# Patient Record
Sex: MALE | Race: WHITE | HISPANIC OR LATINO | Employment: FULL TIME | URBAN - METROPOLITAN AREA
[De-identification: names, ages, dates, MRNs, and addresses within clinical notes are randomized per-mention and may not be internally consistent; named-entity substitution may affect disease eponyms.]

---

## 2022-11-22 ENCOUNTER — HOSPITAL ENCOUNTER (EMERGENCY)
Facility: HOSPITAL | Age: 57
Discharge: HOME | End: 2022-11-22
Attending: EMERGENCY MEDICINE
Payer: MEDICAID

## 2022-11-22 VITALS
SYSTOLIC BLOOD PRESSURE: 164 MMHG | RESPIRATION RATE: 21 BRPM | OXYGEN SATURATION: 98 % | DIASTOLIC BLOOD PRESSURE: 81 MMHG | HEART RATE: 89 BPM | HEIGHT: 65 IN | TEMPERATURE: 98 F

## 2022-11-22 DIAGNOSIS — R11.0 NAUSEA: ICD-10-CM

## 2022-11-22 DIAGNOSIS — R10.11 RIGHT UPPER QUADRANT ABDOMINAL PAIN: Primary | ICD-10-CM

## 2022-11-22 DIAGNOSIS — R73.9 HYPERGLYCEMIA WITHOUT KETOSIS: ICD-10-CM

## 2022-11-22 DIAGNOSIS — I10 PRIMARY HYPERTENSION: ICD-10-CM

## 2022-11-22 DIAGNOSIS — Z91.148 NONCOMPLIANCE WITH MEDICATION REGIMEN: ICD-10-CM

## 2022-11-22 LAB
ALBUMIN SERPL BCP-MCNC: 4.5 G/DL (ref 3.5–5.2)
ALP SERPL-CCNC: 74 U/L (ref 55–135)
ALT SERPL W/O P-5'-P-CCNC: 22 U/L (ref 10–44)
ANION GAP SERPL CALC-SCNC: 15 MMOL/L (ref 8–16)
AST SERPL-CCNC: 17 U/L (ref 10–40)
BASOPHILS # BLD AUTO: 0.03 K/UL (ref 0–0.2)
BASOPHILS NFR BLD: 0.2 % (ref 0–1.9)
BILIRUB SERPL-MCNC: 0.8 MG/DL (ref 0.1–1)
BUN SERPL-MCNC: 20 MG/DL (ref 6–20)
CALCIUM SERPL-MCNC: 9.5 MG/DL (ref 8.7–10.5)
CHLORIDE SERPL-SCNC: 97 MMOL/L (ref 95–110)
CO2 SERPL-SCNC: 24 MMOL/L (ref 23–29)
CREAT SERPL-MCNC: 1.2 MG/DL (ref 0.5–1.4)
DIFFERENTIAL METHOD: ABNORMAL
EOSINOPHIL # BLD AUTO: 0 K/UL (ref 0–0.5)
EOSINOPHIL NFR BLD: 0.1 % (ref 0–8)
ERYTHROCYTE [DISTWIDTH] IN BLOOD BY AUTOMATED COUNT: 11.5 % (ref 11.5–14.5)
EST. GFR  (NO RACE VARIABLE): >60 ML/MIN/1.73 M^2
GLUCOSE SERPL-MCNC: 374 MG/DL (ref 70–110)
HCT VFR BLD AUTO: 47.5 % (ref 40–54)
HCV AB SERPL QL IA: NEGATIVE
HEP C VIRUS HOLD SPECIMEN: NORMAL
HGB BLD-MCNC: 17 G/DL (ref 14–18)
HIV 1+2 AB+HIV1 P24 AG SERPL QL IA: NEGATIVE
IMM GRANULOCYTES # BLD AUTO: 0.04 K/UL (ref 0–0.04)
IMM GRANULOCYTES NFR BLD AUTO: 0.3 % (ref 0–0.5)
LACTATE SERPL-SCNC: 3.2 MMOL/L (ref 0.5–2.2)
LIPASE SERPL-CCNC: 13 U/L (ref 4–60)
LYMPHOCYTES # BLD AUTO: 1.1 K/UL (ref 1–4.8)
LYMPHOCYTES NFR BLD: 8.4 % (ref 18–48)
MCH RBC QN AUTO: 30.2 PG (ref 27–31)
MCHC RBC AUTO-ENTMCNC: 35.8 G/DL (ref 32–36)
MCV RBC AUTO: 85 FL (ref 82–98)
MONOCYTES # BLD AUTO: 0.5 K/UL (ref 0.3–1)
MONOCYTES NFR BLD: 3.5 % (ref 4–15)
NEUTROPHILS # BLD AUTO: 11.6 K/UL (ref 1.8–7.7)
NEUTROPHILS NFR BLD: 87.5 % (ref 38–73)
NRBC BLD-RTO: 0 /100 WBC
PLATELET # BLD AUTO: 210 K/UL (ref 150–450)
PMV BLD AUTO: 11 FL (ref 9.2–12.9)
POCT GLUCOSE: 251 MG/DL (ref 70–110)
POCT GLUCOSE: 366 MG/DL (ref 70–110)
POTASSIUM SERPL-SCNC: 3.9 MMOL/L (ref 3.5–5.1)
PROT SERPL-MCNC: 8.3 G/DL (ref 6–8.4)
RBC # BLD AUTO: 5.62 M/UL (ref 4.6–6.2)
SODIUM SERPL-SCNC: 136 MMOL/L (ref 136–145)
WBC # BLD AUTO: 13.26 K/UL (ref 3.9–12.7)

## 2022-11-22 PROCEDURE — 25000003 PHARM REV CODE 250: Performed by: EMERGENCY MEDICINE

## 2022-11-22 PROCEDURE — 87389 HIV-1 AG W/HIV-1&-2 AB AG IA: CPT | Performed by: EMERGENCY MEDICINE

## 2022-11-22 PROCEDURE — 96374 THER/PROPH/DIAG INJ IV PUSH: CPT

## 2022-11-22 PROCEDURE — 80053 COMPREHEN METABOLIC PANEL: CPT | Performed by: EMERGENCY MEDICINE

## 2022-11-22 PROCEDURE — 86803 HEPATITIS C AB TEST: CPT | Performed by: EMERGENCY MEDICINE

## 2022-11-22 PROCEDURE — 83605 ASSAY OF LACTIC ACID: CPT | Performed by: EMERGENCY MEDICINE

## 2022-11-22 PROCEDURE — 83690 ASSAY OF LIPASE: CPT | Performed by: EMERGENCY MEDICINE

## 2022-11-22 PROCEDURE — 96361 HYDRATE IV INFUSION ADD-ON: CPT

## 2022-11-22 PROCEDURE — 99285 EMERGENCY DEPT VISIT HI MDM: CPT | Mod: 25

## 2022-11-22 PROCEDURE — 96375 TX/PRO/DX INJ NEW DRUG ADDON: CPT

## 2022-11-22 PROCEDURE — 63600175 PHARM REV CODE 636 W HCPCS: Performed by: EMERGENCY MEDICINE

## 2022-11-22 PROCEDURE — 85025 COMPLETE CBC W/AUTO DIFF WBC: CPT | Performed by: EMERGENCY MEDICINE

## 2022-11-22 RX ORDER — CLONIDINE HYDROCHLORIDE 0.1 MG/1
0.1 TABLET ORAL 2 TIMES DAILY
Qty: 60 TABLET | Refills: 0 | Status: ON HOLD | OUTPATIENT
Start: 2022-11-22 | End: 2022-11-29 | Stop reason: HOSPADM

## 2022-11-22 RX ORDER — ONDANSETRON 2 MG/ML
4 INJECTION INTRAMUSCULAR; INTRAVENOUS
Status: COMPLETED | OUTPATIENT
Start: 2022-11-22 | End: 2022-11-22

## 2022-11-22 RX ORDER — ONDANSETRON 4 MG/1
4 TABLET, ORALLY DISINTEGRATING ORAL EVERY 6 HOURS PRN
Qty: 12 TABLET | Refills: 0 | Status: ON HOLD | OUTPATIENT
Start: 2022-11-22 | End: 2023-05-21 | Stop reason: HOSPADM

## 2022-11-22 RX ORDER — FAMOTIDINE 20 MG/1
20 TABLET, FILM COATED ORAL 2 TIMES DAILY PRN
Qty: 20 TABLET | Refills: 0 | Status: ON HOLD | OUTPATIENT
Start: 2022-11-22 | End: 2022-11-29 | Stop reason: SDUPTHER

## 2022-11-22 RX ORDER — MORPHINE SULFATE 4 MG/ML
6 INJECTION, SOLUTION INTRAMUSCULAR; INTRAVENOUS
Status: COMPLETED | OUTPATIENT
Start: 2022-11-22 | End: 2022-11-22

## 2022-11-22 RX ORDER — METFORMIN HYDROCHLORIDE 1000 MG/1
1000 TABLET ORAL 2 TIMES DAILY WITH MEALS
Qty: 60 TABLET | Refills: 1 | Status: SHIPPED | OUTPATIENT
Start: 2022-11-22 | End: 2023-11-22

## 2022-11-22 RX ORDER — CLONIDINE HYDROCHLORIDE 0.1 MG/1
0.1 TABLET ORAL
Status: DISCONTINUED | OUTPATIENT
Start: 2022-11-22 | End: 2022-11-22 | Stop reason: HOSPADM

## 2022-11-22 RX ADMIN — SODIUM CHLORIDE 1000 ML: 0.9 INJECTION, SOLUTION INTRAVENOUS at 05:11

## 2022-11-22 RX ADMIN — MORPHINE SULFATE 6 MG: 4 INJECTION INTRAVENOUS at 03:11

## 2022-11-22 RX ADMIN — SODIUM CHLORIDE 1000 ML: 0.9 INJECTION, SOLUTION INTRAVENOUS at 03:11

## 2022-11-22 RX ADMIN — ONDANSETRON 4 MG: 2 INJECTION INTRAMUSCULAR; INTRAVENOUS at 03:11

## 2022-11-22 NOTE — ED PROVIDER NOTES
SCRIBE #1 NOTE: I, Dominick Castro, am scribing for, and in the presence of, Shady Grande MD. I have scribed the HPI, ROS, and PEx.     SCRIBE #2 NOTE: I, Althea Hernandez, am scribing for, and in the presence of,  Jose Hedrick Jr., MD. I have scribed the remaining portions of the note not scribed by Scribe #1.   History      Chief Complaint   Patient presents with    Abdominal Pain     Left upper quad pain starting last night, associated vomiting        Review of patient's allergies indicates:  No Known Allergies     HPI   HPI    11/22/2022, 3:23 PM   History obtained from the patient      History of Present Illness: Russell Orellana is a 57 y.o. male patient with a PMHx of DM, HTN who presents to the Emergency Department for R-sided abdominal pain, onset 4 hours PTA. Pt states that his pain started shortly after eating lunch at 11AM. Pt states that he also ate and drank at 2PM today. Symptoms are constant and moderate in severity. No mitigating or exacerbating factors reported. Associated sxs include n/v. Patient denies any fever, chills, SOB, CP, weakness, numbness, dizziness, headache, and all other sxs at this time. Pt states that he has not been compliant with his DM medication regimen. No further complaints or concerns at this time.     Arrival mode: EMS    PCP: Primary Doctor No       Past Medical History:  Past Medical History:   Diagnosis Date    Diabetes mellitus     High cholesterol     Hypertension        Past Surgical History:  History reviewed. No pertinent surgical history.      Family History:  Family History   Problem Relation Age of Onset    No Known Problems Mother     No Known Problems Father        Social History:  Social History     Tobacco Use    Smoking status: Never    Smokeless tobacco: Never   Substance and Sexual Activity    Alcohol use: Not Currently    Drug use: Never    Sexual activity: Not on file       ROS   Review of Systems   Constitutional:  Negative for chills and fever.   HENT:   "Negative for sore throat.    Respiratory:  Negative for shortness of breath.    Cardiovascular:  Negative for chest pain.   Gastrointestinal:  Positive for abdominal pain (R), nausea and vomiting. Negative for diarrhea.   Genitourinary:  Negative for dysuria.   Musculoskeletal:  Negative for back pain.   Skin:  Negative for rash.   Neurological:  Negative for dizziness, weakness, light-headedness, numbness and headaches.   Hematological:  Does not bruise/bleed easily.   All other systems reviewed and are negative.    Physical Exam      Initial Vitals [11/22/22 1512]   BP Pulse Resp Temp SpO2   (!) 190/100 100 (!) 22 98.1 °F (36.7 °C) 96 %      MAP       --          Vitals:    11/22/22 1512 11/22/22 1525 11/22/22 1535 11/22/22 1600   BP: (!) 190/100  (!) 175/87 (!) 157/77   Pulse: 100  86 87   Resp: (!) 22 18 12 18   Temp: 98.1 °F (36.7 °C)      SpO2: 96%  95% 96%   Height: 5' 5" (1.651 m)         Physical Exam  Nursing Notes and Vital Signs Reviewed.  Constitutional: Patient is in no acute distress. Well-developed and well-nourished.  Head: Atraumatic. Normocephalic.  Eyes: PERRL. EOM intact. Conjunctivae are not pale. No scleral icterus.  ENT: Mucous membranes are moist. Oropharynx is clear and symmetric.    Neck: Supple. Full ROM.  Cardiovascular: Regular rate. Regular rhythm. No murmurs, rubs, or gallops. Distal pulses are 2+ and symmetric.  Pulmonary/Chest: No respiratory distress. Clear to auscultation bilaterally. No wheezing or rales.  Abdominal: Soft and non-distended.  There is no tenderness.  No rebound, guarding, or rigidity.   Musculoskeletal: Moves all extremities. No obvious deformities. No edema.  Skin: Warm and dry.  Neurological:  Alert, awake, and appropriate.  Normal speech.  No acute focal neurological deficits are appreciated.  Psychiatric: Normal affect. Good eye contact. Appropriate in content.    ED Course    Procedures  ED Vital Signs:  Vitals:    11/22/22 1512 11/22/22 1525 11/22/22 1535 " "11/22/22 1600   BP: (!) 190/100  (!) 175/87 (!) 157/77   Pulse: 100  86 87   Resp: (!) 22 18 12 18   Temp: 98.1 °F (36.7 °C)      SpO2: 96%  95% 96%   Height: 5' 5" (1.651 m)          Abnormal Lab Results:  Labs Reviewed   CBC W/ AUTO DIFFERENTIAL - Abnormal; Notable for the following components:       Result Value    WBC 13.26 (*)     Gran # (ANC) 11.6 (*)     Gran % 87.5 (*)     Lymph % 8.4 (*)     Mono % 3.5 (*)     All other components within normal limits    Narrative:     Release to patient->Immediate   COMPREHENSIVE METABOLIC PANEL - Abnormal; Notable for the following components:    Glucose 374 (*)     All other components within normal limits    Narrative:     Release to patient->Immediate   LACTIC ACID, PLASMA - Abnormal; Notable for the following components:    Lactate (Lactic Acid) 3.2 (*)     All other components within normal limits    Narrative:     Release to patient->Immediate   POCT GLUCOSE - Abnormal; Notable for the following components:    POCT Glucose 366 (*)     All other components within normal limits   LIPASE    Narrative:     Release to patient->Immediate   HIV 1 / 2 ANTIBODY    Narrative:     Release to patient->Immediate   HEPATITIS C ANTIBODY    Narrative:     Release to patient->Immediate   HEP C VIRUS HOLD SPECIMEN    Narrative:     Release to patient->Immediate   URINALYSIS, REFLEX TO URINE CULTURE        All Lab Results:  Results for orders placed or performed during the hospital encounter of 11/22/22   CBC W/ AUTO DIFFERENTIAL   Result Value Ref Range    WBC 13.26 (H) 3.90 - 12.70 K/uL    RBC 5.62 4.60 - 6.20 M/uL    Hemoglobin 17.0 14.0 - 18.0 g/dL    Hematocrit 47.5 40.0 - 54.0 %    MCV 85 82 - 98 fL    MCH 30.2 27.0 - 31.0 pg    MCHC 35.8 32.0 - 36.0 g/dL    RDW 11.5 11.5 - 14.5 %    Platelets 210 150 - 450 K/uL    MPV 11.0 9.2 - 12.9 fL    Immature Granulocytes 0.3 0.0 - 0.5 %    Gran # (ANC) 11.6 (H) 1.8 - 7.7 K/uL    Immature Grans (Abs) 0.04 0.00 - 0.04 K/uL    Lymph # 1.1 " 1.0 - 4.8 K/uL    Mono # 0.5 0.3 - 1.0 K/uL    Eos # 0.0 0.0 - 0.5 K/uL    Baso # 0.03 0.00 - 0.20 K/uL    nRBC 0 0 /100 WBC    Gran % 87.5 (H) 38.0 - 73.0 %    Lymph % 8.4 (L) 18.0 - 48.0 %    Mono % 3.5 (L) 4.0 - 15.0 %    Eosinophil % 0.1 0.0 - 8.0 %    Basophil % 0.2 0.0 - 1.9 %    Differential Method Automated    Comp. Metabolic Panel   Result Value Ref Range    Sodium 136 136 - 145 mmol/L    Potassium 3.9 3.5 - 5.1 mmol/L    Chloride 97 95 - 110 mmol/L    CO2 24 23 - 29 mmol/L    Glucose 374 (H) 70 - 110 mg/dL    BUN 20 6 - 20 mg/dL    Creatinine 1.2 0.5 - 1.4 mg/dL    Calcium 9.5 8.7 - 10.5 mg/dL    Total Protein 8.3 6.0 - 8.4 g/dL    Albumin 4.5 3.5 - 5.2 g/dL    Total Bilirubin 0.8 0.1 - 1.0 mg/dL    Alkaline Phosphatase 74 55 - 135 U/L    AST 17 10 - 40 U/L    ALT 22 10 - 44 U/L    Anion Gap 15 8 - 16 mmol/L    eGFR >60 >60 mL/min/1.73 m^2   Lipase   Result Value Ref Range    Lipase 13 4 - 60 U/L   Lactic acid, plasma   Result Value Ref Range    Lactate (Lactic Acid) 3.2 (H) 0.5 - 2.2 mmol/L   HIV 1/2 Ag/Ab (4th Gen)   Result Value Ref Range    HIV 1/2 Ag/Ab Negative Negative   Hepatitis C Antibody   Result Value Ref Range    Hepatitis C Ab Negative Negative   HCV Virus Hold Specimen   Result Value Ref Range    HEP C Virus Hold Specimen Hold for HCV sendout    POCT glucose   Result Value Ref Range    POCT Glucose 366 (H) 70 - 110 mg/dL     Imaging Results:  Imaging Results              US Abdomen Limited (Final result)  Result time 11/22/22 16:09:12      Final result by Jess Escobar MD (11/22/22 16:09:12)                   Impression:      No significant sonographic abnormality.  Limited exam due to body habitus.  The common bile duct was not visualized      Electronically signed by: Shawn Mercado  Date:    11/22/2022  Time:    16:09               Narrative:    EXAMINATION:  US ABDOMEN LIMITED    CLINICAL HISTORY:  Abdominal Pain - Gallbladder;    TECHNIQUE:  Limited ultrasound of the right upper  quadrant of the abdomen (including pancreas, liver, gallbladder, common bile duct, and spleen) was performed.    COMPARISON:  None.    FINDINGS:  Liver: Normal in size, measuring 13.3 cm. Homogeneous echotexture. No focal hepatic lesions.    Gallbladder: No calculi, wall thickening, or pericholecystic fluid.  No sonographic Gibbs's sign.    Biliary system: The common bile duct of was not visualized no intrahepatic ductal dilatation.    The right kidney is without hydronephrosis measuring up to 10.1 cm    Miscellaneous: No upper abdominal ascites.                                              The Emergency Provider reviewed the vital signs and test results, which are outlined above.    ED Discussion     5:21 PM patient's abdomen is benign.  He has no tenderness palpation.  He is on his 2 L of IV fluids he has hyperglycemia without ketosis.  He has been noncompliant with medications and states he will start up with his medications for his hypertension as well as his diabetes and continue with a diabetic diet.         ED Medication(s):  Medications   sodium chloride 0.9% bolus 1,000 mL (has no administration in time range)   cloNIDine tablet 0.1 mg (has no administration in time range)   sodium chloride 0.9% bolus 1,000 mL (1,000 mLs Intravenous New Bag 11/22/22 1526)   morphine injection 6 mg (6 mg Intravenous Given 11/22/22 1525)   ondansetron injection 4 mg (4 mg Intravenous Given 11/22/22 1525)     New Prescriptions    CLONIDINE (CATAPRES) 0.1 MG TABLET    Take 1 tablet (0.1 mg total) by mouth 2 (two) times daily.    FAMOTIDINE (PEPCID) 20 MG TABLET    Take 1 tablet (20 mg total) by mouth 2 (two) times daily as needed for Heartburn.    METFORMIN (GLUCOPHAGE) 1000 MG TABLET    Take 1 tablet (1,000 mg total) by mouth 2 (two) times daily with meals.    ONDANSETRON (ZOFRAN-ODT) 4 MG TBDL    Take 1 tablet (4 mg total) by mouth every 6 (six) hours as needed (nausea).      Follow-up Information       Your primary care  physician In 3 days.               Schedule an appointment as soon as possible for a visit  with [facility].    Why: As needed  Contact information:  [address] [facility] [address] [phone]                               Medical Decision Making    Medical Decision Making:   Clinical Tests:   Lab Tests: Ordered and Reviewed  Radiological Study: Ordered and Reviewed         Scribe Attestation:   Scribe #1: I performed the above scribed service and the documentation accurately describes the services I performed. I attest to the accuracy of the note.    Attending:   Physician Attestation Statement for Scribe #1: I, Shady Grande MD, personally performed the services described in this documentation, as scribed by Dominick Castro, in my presence, and it is both accurate and complete.       Scribe Attestation:   Scribe #2: I performed the above scribed service and the documentation accurately describes the services I performed. I attest to the accuracy of the note.    Attending Attestation:           Physician Attestation for Scribe:    Physician Attestation Statement for Scribe #2: I, Jose Hedrick Jr., MD, reviewed documentation, as scribed by Althea Hernandez in my presence, and it is both accurate and complete. I also acknowledge and confirm the content of the note done by Scribe #1.        Clinical Impression       ICD-10-CM ICD-9-CM   1. Right upper quadrant abdominal pain  R10.11 789.01   2. Nausea  R11.0 787.02   3. Primary hypertension  I10 401.9   4. Noncompliance with medication regimen  Z91.14 V15.81   5. Hyperglycemia without ketosis  R73.9 790.29       Disposition:   Disposition: Discharged  Condition: Stable       Shady Grande MD  11/22/22 3187

## 2022-11-27 ENCOUNTER — HOSPITAL ENCOUNTER (OUTPATIENT)
Facility: HOSPITAL | Age: 57
Discharge: HOME | End: 2022-11-29
Attending: EMERGENCY MEDICINE | Admitting: INTERNAL MEDICINE
Payer: MEDICAID

## 2022-11-27 DIAGNOSIS — R73.9 HYPERGLYCEMIA: ICD-10-CM

## 2022-11-27 DIAGNOSIS — E11.65 TYPE 2 DIABETES MELLITUS WITH HYPERGLYCEMIA, WITHOUT LONG-TERM CURRENT USE OF INSULIN: Primary | ICD-10-CM

## 2022-11-27 DIAGNOSIS — R10.9 ABDOMINAL PAIN, UNSPECIFIED ABDOMINAL LOCATION: ICD-10-CM

## 2022-11-27 PROBLEM — I10 PRIMARY HYPERTENSION: Status: ACTIVE | Noted: 2022-11-27

## 2022-11-27 PROBLEM — R10.84 GENERALIZED ABDOMINAL PAIN: Status: ACTIVE | Noted: 2022-11-27

## 2022-11-27 LAB
ALBUMIN SERPL BCP-MCNC: 4.5 G/DL (ref 3.5–5.2)
ALP SERPL-CCNC: 79 U/L (ref 55–135)
ALT SERPL W/O P-5'-P-CCNC: 15 U/L (ref 10–44)
AMPHET+METHAMPHET UR QL: NEGATIVE
ANION GAP SERPL CALC-SCNC: 17 MMOL/L (ref 8–16)
AST SERPL-CCNC: 16 U/L (ref 10–40)
B-OH-BUTYR BLD STRIP-SCNC: 0.9 MMOL/L (ref 0–0.5)
BACTERIA #/AREA URNS HPF: NORMAL /HPF
BARBITURATES UR QL SCN>200 NG/ML: NEGATIVE
BASOPHILS # BLD AUTO: 0.04 K/UL (ref 0–0.2)
BASOPHILS NFR BLD: 0.3 % (ref 0–1.9)
BENZODIAZ UR QL SCN>200 NG/ML: NEGATIVE
BILIRUB SERPL-MCNC: 0.8 MG/DL (ref 0.1–1)
BILIRUB UR QL STRIP: NEGATIVE
BUN SERPL-MCNC: 21 MG/DL (ref 6–20)
BZE UR QL SCN: NEGATIVE
CALCIUM SERPL-MCNC: 10 MG/DL (ref 8.7–10.5)
CANNABINOIDS UR QL SCN: NEGATIVE
CHLORIDE SERPL-SCNC: 98 MMOL/L (ref 95–110)
CLARITY UR: CLEAR
CO2 SERPL-SCNC: 21 MMOL/L (ref 23–29)
COLOR UR: COLORLESS
CREAT SERPL-MCNC: 1.2 MG/DL (ref 0.5–1.4)
CREAT UR-MCNC: 25.9 MG/DL (ref 23–375)
DIFFERENTIAL METHOD: ABNORMAL
EOSINOPHIL # BLD AUTO: 0 K/UL (ref 0–0.5)
EOSINOPHIL NFR BLD: 0 % (ref 0–8)
ERYTHROCYTE [DISTWIDTH] IN BLOOD BY AUTOMATED COUNT: 11.3 % (ref 11.5–14.5)
EST. GFR  (NO RACE VARIABLE): >60 ML/MIN/1.73 M^2
GLUCOSE SERPL-MCNC: 393 MG/DL (ref 70–110)
GLUCOSE UR QL STRIP: ABNORMAL
HCT VFR BLD AUTO: 47.3 % (ref 40–54)
HEP C VIRUS HOLD SPECIMEN: NORMAL
HGB BLD-MCNC: 17.1 G/DL (ref 14–18)
HGB UR QL STRIP: NEGATIVE
HYALINE CASTS #/AREA URNS LPF: 0 /LPF
IMM GRANULOCYTES # BLD AUTO: 0.05 K/UL (ref 0–0.04)
IMM GRANULOCYTES NFR BLD AUTO: 0.4 % (ref 0–0.5)
KETONES UR QL STRIP: ABNORMAL
LEUKOCYTE ESTERASE UR QL STRIP: NEGATIVE
LIPASE SERPL-CCNC: 19 U/L (ref 4–60)
LYMPHOCYTES # BLD AUTO: 0.8 K/UL (ref 1–4.8)
LYMPHOCYTES NFR BLD: 6.4 % (ref 18–48)
MCH RBC QN AUTO: 30.4 PG (ref 27–31)
MCHC RBC AUTO-ENTMCNC: 36.2 G/DL (ref 32–36)
MCV RBC AUTO: 84 FL (ref 82–98)
METHADONE UR QL SCN>300 NG/ML: NEGATIVE
MICROSCOPIC COMMENT: NORMAL
MONOCYTES # BLD AUTO: 0.3 K/UL (ref 0.3–1)
MONOCYTES NFR BLD: 2.3 % (ref 4–15)
NEUTROPHILS # BLD AUTO: 11.2 K/UL (ref 1.8–7.7)
NEUTROPHILS NFR BLD: 90.6 % (ref 38–73)
NITRITE UR QL STRIP: NEGATIVE
NRBC BLD-RTO: 0 /100 WBC
OPIATES UR QL SCN: NEGATIVE
PCP UR QL SCN>25 NG/ML: NEGATIVE
PH UR STRIP: 8 [PH] (ref 5–8)
PLATELET # BLD AUTO: 197 K/UL (ref 150–450)
PMV BLD AUTO: 11 FL (ref 9.2–12.9)
POCT GLUCOSE: 292 MG/DL (ref 70–110)
POCT GLUCOSE: 307 MG/DL (ref 70–110)
POCT GLUCOSE: 385 MG/DL (ref 70–110)
POTASSIUM SERPL-SCNC: 4.4 MMOL/L (ref 3.5–5.1)
PROT SERPL-MCNC: 8.4 G/DL (ref 6–8.4)
PROT UR QL STRIP: ABNORMAL
RBC # BLD AUTO: 5.63 M/UL (ref 4.6–6.2)
RBC #/AREA URNS HPF: 3 /HPF (ref 0–4)
SODIUM SERPL-SCNC: 136 MMOL/L (ref 136–145)
SP GR UR STRIP: 1.02 (ref 1–1.03)
TOXICOLOGY INFORMATION: NORMAL
URN SPEC COLLECT METH UR: ABNORMAL
UROBILINOGEN UR STRIP-ACNC: NEGATIVE EU/DL
WBC # BLD AUTO: 12.41 K/UL (ref 3.9–12.7)
WBC #/AREA URNS HPF: 1 /HPF (ref 0–5)
YEAST URNS QL MICRO: NORMAL

## 2022-11-27 PROCEDURE — 81000 URINALYSIS NONAUTO W/SCOPE: CPT | Mod: 59 | Performed by: INTERNAL MEDICINE

## 2022-11-27 PROCEDURE — 80053 COMPREHEN METABOLIC PANEL: CPT | Performed by: NURSE PRACTITIONER

## 2022-11-27 PROCEDURE — 96374 THER/PROPH/DIAG INJ IV PUSH: CPT

## 2022-11-27 PROCEDURE — 63600175 PHARM REV CODE 636 W HCPCS: Performed by: INTERNAL MEDICINE

## 2022-11-27 PROCEDURE — 80307 DRUG TEST PRSMV CHEM ANLYZR: CPT | Performed by: INTERNAL MEDICINE

## 2022-11-27 PROCEDURE — C9113 INJ PANTOPRAZOLE SODIUM, VIA: HCPCS | Performed by: INTERNAL MEDICINE

## 2022-11-27 PROCEDURE — 82010 KETONE BODYS QUAN: CPT | Performed by: EMERGENCY MEDICINE

## 2022-11-27 PROCEDURE — 96361 HYDRATE IV INFUSION ADD-ON: CPT

## 2022-11-27 PROCEDURE — 99285 EMERGENCY DEPT VISIT HI MDM: CPT | Mod: 25

## 2022-11-27 PROCEDURE — 96375 TX/PRO/DX INJ NEW DRUG ADDON: CPT

## 2022-11-27 PROCEDURE — 96372 THER/PROPH/DIAG INJ SC/IM: CPT | Mod: 59 | Performed by: INTERNAL MEDICINE

## 2022-11-27 PROCEDURE — G0378 HOSPITAL OBSERVATION PER HR: HCPCS

## 2022-11-27 PROCEDURE — 25000003 PHARM REV CODE 250: Performed by: EMERGENCY MEDICINE

## 2022-11-27 PROCEDURE — 25000003 PHARM REV CODE 250: Performed by: INTERNAL MEDICINE

## 2022-11-27 PROCEDURE — 85025 COMPLETE CBC W/AUTO DIFF WBC: CPT | Performed by: NURSE PRACTITIONER

## 2022-11-27 PROCEDURE — 82962 GLUCOSE BLOOD TEST: CPT

## 2022-11-27 PROCEDURE — 63600175 PHARM REV CODE 636 W HCPCS: Performed by: NURSE PRACTITIONER

## 2022-11-27 PROCEDURE — 83690 ASSAY OF LIPASE: CPT | Performed by: NURSE PRACTITIONER

## 2022-11-27 PROCEDURE — 25000003 PHARM REV CODE 250: Performed by: NURSE PRACTITIONER

## 2022-11-27 PROCEDURE — 63600175 PHARM REV CODE 636 W HCPCS: Performed by: EMERGENCY MEDICINE

## 2022-11-27 RX ORDER — LABETALOL HYDROCHLORIDE 5 MG/ML
10 INJECTION, SOLUTION INTRAVENOUS EVERY 6 HOURS PRN
Status: DISCONTINUED | OUTPATIENT
Start: 2022-11-27 | End: 2022-11-29 | Stop reason: HOSPADM

## 2022-11-27 RX ORDER — ENOXAPARIN SODIUM 100 MG/ML
40 INJECTION SUBCUTANEOUS EVERY 24 HOURS
Status: DISCONTINUED | OUTPATIENT
Start: 2022-11-27 | End: 2022-11-29 | Stop reason: HOSPADM

## 2022-11-27 RX ORDER — GUAIFENESIN 100 MG/5ML
200 SOLUTION ORAL EVERY 4 HOURS PRN
Status: DISCONTINUED | OUTPATIENT
Start: 2022-11-27 | End: 2022-11-29 | Stop reason: HOSPADM

## 2022-11-27 RX ORDER — HYDROMORPHONE HYDROCHLORIDE 1 MG/ML
1 INJECTION, SOLUTION INTRAMUSCULAR; INTRAVENOUS; SUBCUTANEOUS EVERY 4 HOURS PRN
Status: DISCONTINUED | OUTPATIENT
Start: 2022-11-27 | End: 2022-11-29

## 2022-11-27 RX ORDER — INSULIN ASPART 100 [IU]/ML
1-10 INJECTION, SOLUTION INTRAVENOUS; SUBCUTANEOUS
Status: DISCONTINUED | OUTPATIENT
Start: 2022-11-27 | End: 2022-11-29 | Stop reason: HOSPADM

## 2022-11-27 RX ORDER — ONDANSETRON 2 MG/ML
8 INJECTION INTRAMUSCULAR; INTRAVENOUS
Status: COMPLETED | OUTPATIENT
Start: 2022-11-27 | End: 2022-11-27

## 2022-11-27 RX ORDER — PANTOPRAZOLE SODIUM 40 MG/10ML
40 INJECTION, POWDER, LYOPHILIZED, FOR SOLUTION INTRAVENOUS DAILY
Status: DISCONTINUED | OUTPATIENT
Start: 2022-11-27 | End: 2022-11-29

## 2022-11-27 RX ORDER — IBUPROFEN 200 MG
16 TABLET ORAL
Status: DISCONTINUED | OUTPATIENT
Start: 2022-11-27 | End: 2022-11-29 | Stop reason: HOSPADM

## 2022-11-27 RX ORDER — ONDANSETRON 2 MG/ML
4 INJECTION INTRAMUSCULAR; INTRAVENOUS EVERY 8 HOURS PRN
Status: DISCONTINUED | OUTPATIENT
Start: 2022-11-27 | End: 2022-11-28

## 2022-11-27 RX ORDER — SODIUM CHLORIDE 9 MG/ML
INJECTION, SOLUTION INTRAVENOUS CONTINUOUS
Status: DISCONTINUED | OUTPATIENT
Start: 2022-11-27 | End: 2022-11-27

## 2022-11-27 RX ORDER — SODIUM CHLORIDE 9 MG/ML
1000 INJECTION, SOLUTION INTRAVENOUS
Status: COMPLETED | OUTPATIENT
Start: 2022-11-27 | End: 2022-11-27

## 2022-11-27 RX ORDER — HYDRALAZINE HYDROCHLORIDE 20 MG/ML
10 INJECTION INTRAMUSCULAR; INTRAVENOUS EVERY 8 HOURS PRN
Status: DISCONTINUED | OUTPATIENT
Start: 2022-11-27 | End: 2022-11-29 | Stop reason: HOSPADM

## 2022-11-27 RX ORDER — HYDROMORPHONE HYDROCHLORIDE 1 MG/ML
0.5 INJECTION, SOLUTION INTRAMUSCULAR; INTRAVENOUS; SUBCUTANEOUS EVERY 4 HOURS PRN
Status: DISCONTINUED | OUTPATIENT
Start: 2022-11-27 | End: 2022-11-29

## 2022-11-27 RX ORDER — MORPHINE SULFATE 4 MG/ML
4 INJECTION, SOLUTION INTRAMUSCULAR; INTRAVENOUS
Status: COMPLETED | OUTPATIENT
Start: 2022-11-27 | End: 2022-11-27

## 2022-11-27 RX ORDER — IBUPROFEN 200 MG
24 TABLET ORAL
Status: DISCONTINUED | OUTPATIENT
Start: 2022-11-27 | End: 2022-11-29 | Stop reason: HOSPADM

## 2022-11-27 RX ORDER — MAG HYDROX/ALUMINUM HYD/SIMETH 200-200-20
30 SUSPENSION, ORAL (FINAL DOSE FORM) ORAL EVERY 6 HOURS PRN
Status: DISCONTINUED | OUTPATIENT
Start: 2022-11-27 | End: 2022-11-29 | Stop reason: HOSPADM

## 2022-11-27 RX ORDER — GLUCAGON 1 MG
1 KIT INJECTION
Status: DISCONTINUED | OUTPATIENT
Start: 2022-11-27 | End: 2022-11-29 | Stop reason: HOSPADM

## 2022-11-27 RX ORDER — SODIUM CHLORIDE 9 MG/ML
INJECTION, SOLUTION INTRAVENOUS CONTINUOUS
Status: ACTIVE | OUTPATIENT
Start: 2022-11-27 | End: 2022-11-28

## 2022-11-27 RX ORDER — ACETAMINOPHEN 325 MG/1
650 TABLET ORAL EVERY 6 HOURS PRN
Status: DISCONTINUED | OUTPATIENT
Start: 2022-11-27 | End: 2022-11-29 | Stop reason: HOSPADM

## 2022-11-27 RX ADMIN — HYDRALAZINE HYDROCHLORIDE 10 MG: 20 INJECTION, SOLUTION INTRAMUSCULAR; INTRAVENOUS at 09:11

## 2022-11-27 RX ADMIN — SODIUM CHLORIDE: 0.9 INJECTION, SOLUTION INTRAVENOUS at 08:11

## 2022-11-27 RX ADMIN — ENOXAPARIN SODIUM 40 MG: 40 INJECTION SUBCUTANEOUS at 08:11

## 2022-11-27 RX ADMIN — INSULIN HUMAN 5 UNITS: 100 INJECTION, SOLUTION PARENTERAL at 07:11

## 2022-11-27 RX ADMIN — PANTOPRAZOLE SODIUM 40 MG: 40 INJECTION, POWDER, FOR SOLUTION INTRAVENOUS at 08:11

## 2022-11-27 RX ADMIN — SODIUM CHLORIDE 1000 ML: 0.9 INJECTION, SOLUTION INTRAVENOUS at 04:11

## 2022-11-27 RX ADMIN — MORPHINE SULFATE 4 MG: 4 INJECTION INTRAVENOUS at 07:11

## 2022-11-27 RX ADMIN — ONDANSETRON 8 MG: 2 INJECTION INTRAMUSCULAR; INTRAVENOUS at 04:11

## 2022-11-27 RX ADMIN — SODIUM CHLORIDE 1000 ML: 0.9 INJECTION, SOLUTION INTRAVENOUS at 07:11

## 2022-11-27 NOTE — FIRST PROVIDER EVALUATION
Medical screening examination initiated.  I have conducted a focused provider triage encounter, findings are as follows:    Brief history of present illness:  57-year-old male with complaint of epigastric pain with nausea vomiting for the past 5 days    There were no vitals filed for this visit.    Pertinent physical exam:  epigastric tenderness

## 2022-11-27 NOTE — ED NOTES
Bed: 13  Expected date:   Expected time:   Means of arrival:   Comments:  Russell Orellana when clean

## 2022-11-28 LAB
ALBUMIN SERPL BCP-MCNC: 3.5 G/DL (ref 3.5–5.2)
ALP SERPL-CCNC: 54 U/L (ref 55–135)
ALT SERPL W/O P-5'-P-CCNC: 15 U/L (ref 10–44)
ANION GAP SERPL CALC-SCNC: 11 MMOL/L (ref 8–16)
AST SERPL-CCNC: 14 U/L (ref 10–40)
BASOPHILS # BLD AUTO: 0.04 K/UL (ref 0–0.2)
BASOPHILS NFR BLD: 0.4 % (ref 0–1.9)
BILIRUB SERPL-MCNC: 0.6 MG/DL (ref 0.1–1)
BUN SERPL-MCNC: 22 MG/DL (ref 6–20)
CALCIUM SERPL-MCNC: 8.2 MG/DL (ref 8.7–10.5)
CHLORIDE SERPL-SCNC: 105 MMOL/L (ref 95–110)
CO2 SERPL-SCNC: 21 MMOL/L (ref 23–29)
CREAT SERPL-MCNC: 1.1 MG/DL (ref 0.5–1.4)
DIFFERENTIAL METHOD: NORMAL
EOSINOPHIL # BLD AUTO: 0.1 K/UL (ref 0–0.5)
EOSINOPHIL NFR BLD: 0.5 % (ref 0–8)
ERYTHROCYTE [DISTWIDTH] IN BLOOD BY AUTOMATED COUNT: 11.7 % (ref 11.5–14.5)
EST. GFR  (NO RACE VARIABLE): >60 ML/MIN/1.73 M^2
ESTIMATED AVG GLUCOSE: 255 MG/DL (ref 68–131)
GLUCOSE SERPL-MCNC: 203 MG/DL (ref 70–110)
HBA1C MFR BLD: 10.5 % (ref 4–5.6)
HCT VFR BLD AUTO: 42.3 % (ref 40–54)
HGB BLD-MCNC: 14.7 G/DL (ref 14–18)
IMM GRANULOCYTES # BLD AUTO: 0.04 K/UL (ref 0–0.04)
IMM GRANULOCYTES NFR BLD AUTO: 0.4 % (ref 0–0.5)
LYMPHOCYTES # BLD AUTO: 2.3 K/UL (ref 1–4.8)
LYMPHOCYTES NFR BLD: 20.5 % (ref 18–48)
MAGNESIUM SERPL-MCNC: 2 MG/DL (ref 1.6–2.6)
MCH RBC QN AUTO: 29.9 PG (ref 27–31)
MCHC RBC AUTO-ENTMCNC: 34.8 G/DL (ref 32–36)
MCV RBC AUTO: 86 FL (ref 82–98)
MONOCYTES # BLD AUTO: 1 K/UL (ref 0.3–1)
MONOCYTES NFR BLD: 9.3 % (ref 4–15)
NEUTROPHILS # BLD AUTO: 7.6 K/UL (ref 1.8–7.7)
NEUTROPHILS NFR BLD: 68.9 % (ref 38–73)
NRBC BLD-RTO: 0 /100 WBC
PLATELET # BLD AUTO: 209 K/UL (ref 150–450)
PMV BLD AUTO: 11 FL (ref 9.2–12.9)
POCT GLUCOSE: 105 MG/DL (ref 70–110)
POCT GLUCOSE: 119 MG/DL (ref 70–110)
POCT GLUCOSE: 122 MG/DL (ref 70–110)
POCT GLUCOSE: 175 MG/DL (ref 70–110)
POTASSIUM SERPL-SCNC: 3.7 MMOL/L (ref 3.5–5.1)
PROT SERPL-MCNC: 6.5 G/DL (ref 6–8.4)
RBC # BLD AUTO: 4.92 M/UL (ref 4.6–6.2)
SODIUM SERPL-SCNC: 137 MMOL/L (ref 136–145)
WBC # BLD AUTO: 11.03 K/UL (ref 3.9–12.7)

## 2022-11-28 PROCEDURE — 96372 THER/PROPH/DIAG INJ SC/IM: CPT | Performed by: INTERNAL MEDICINE

## 2022-11-28 PROCEDURE — 96376 TX/PRO/DX INJ SAME DRUG ADON: CPT

## 2022-11-28 PROCEDURE — 25000003 PHARM REV CODE 250: Performed by: INTERNAL MEDICINE

## 2022-11-28 PROCEDURE — 63600175 PHARM REV CODE 636 W HCPCS: Performed by: STUDENT IN AN ORGANIZED HEALTH CARE EDUCATION/TRAINING PROGRAM

## 2022-11-28 PROCEDURE — C9113 INJ PANTOPRAZOLE SODIUM, VIA: HCPCS | Performed by: INTERNAL MEDICINE

## 2022-11-28 PROCEDURE — 83735 ASSAY OF MAGNESIUM: CPT | Performed by: INTERNAL MEDICINE

## 2022-11-28 PROCEDURE — 85025 COMPLETE CBC W/AUTO DIFF WBC: CPT | Performed by: INTERNAL MEDICINE

## 2022-11-28 PROCEDURE — 96361 HYDRATE IV INFUSION ADD-ON: CPT

## 2022-11-28 PROCEDURE — 83036 HEMOGLOBIN GLYCOSYLATED A1C: CPT | Performed by: HOSPITALIST

## 2022-11-28 PROCEDURE — 63600175 PHARM REV CODE 636 W HCPCS: Performed by: INTERNAL MEDICINE

## 2022-11-28 PROCEDURE — 36415 COLL VENOUS BLD VENIPUNCTURE: CPT | Performed by: HOSPITALIST

## 2022-11-28 PROCEDURE — G0378 HOSPITAL OBSERVATION PER HR: HCPCS

## 2022-11-28 PROCEDURE — 80053 COMPREHEN METABOLIC PANEL: CPT | Performed by: INTERNAL MEDICINE

## 2022-11-28 PROCEDURE — 36415 COLL VENOUS BLD VENIPUNCTURE: CPT | Performed by: INTERNAL MEDICINE

## 2022-11-28 PROCEDURE — 96372 THER/PROPH/DIAG INJ SC/IM: CPT | Performed by: STUDENT IN AN ORGANIZED HEALTH CARE EDUCATION/TRAINING PROGRAM

## 2022-11-28 RX ORDER — INSULIN ASPART 100 [IU]/ML
5 INJECTION, SOLUTION INTRAVENOUS; SUBCUTANEOUS
Status: DISCONTINUED | OUTPATIENT
Start: 2022-11-28 | End: 2022-11-29

## 2022-11-28 RX ORDER — ONDANSETRON 2 MG/ML
8 INJECTION INTRAMUSCULAR; INTRAVENOUS EVERY 8 HOURS PRN
Status: DISCONTINUED | OUTPATIENT
Start: 2022-11-28 | End: 2022-11-29 | Stop reason: HOSPADM

## 2022-11-28 RX ADMIN — INSULIN ASPART 5 UNITS: 100 INJECTION, SOLUTION INTRAVENOUS; SUBCUTANEOUS at 11:11

## 2022-11-28 RX ADMIN — ENOXAPARIN SODIUM 40 MG: 40 INJECTION SUBCUTANEOUS at 04:11

## 2022-11-28 RX ADMIN — PANTOPRAZOLE SODIUM 40 MG: 40 INJECTION, POWDER, FOR SOLUTION INTRAVENOUS at 08:11

## 2022-11-28 RX ADMIN — INSULIN ASPART 5 UNITS: 100 INJECTION, SOLUTION INTRAVENOUS; SUBCUTANEOUS at 08:11

## 2022-11-28 RX ADMIN — INSULIN ASPART 5 UNITS: 100 INJECTION, SOLUTION INTRAVENOUS; SUBCUTANEOUS at 04:11

## 2022-11-28 RX ADMIN — SODIUM CHLORIDE: 0.9 INJECTION, SOLUTION INTRAVENOUS at 04:11

## 2022-11-28 NOTE — PLAN OF CARE
Problem: Adult Inpatient Plan of Care  Goal: Plan of Care Review  Outcome: Ongoing, Progressing  Goal: Patient-Specific Goal (Individualized)  Outcome: Ongoing, Progressing  Goal: Absence of Hospital-Acquired Illness or Injury  Outcome: Ongoing, Progressing  Goal: Optimal Comfort and Wellbeing  Outcome: Ongoing, Progressing  Goal: Readiness for Transition of Care  Outcome: Ongoing, Progressing     Problem: Diabetes Comorbidity  Goal: Blood Glucose Level Within Targeted Range  Outcome: Ongoing, Progressing     Problem: Pain Acute  Goal: Acceptable Pain Control and Functional Ability  Outcome: Ongoing, Progressing     Problem: Fall Injury Risk  Goal: Absence of Fall and Fall-Related Injury  Outcome: Ongoing, Progressing

## 2022-11-28 NOTE — PLAN OF CARE
[facility] - Med Surg  Discharge Final Note    Primary Care Provider: Primary Doctor No    Expected Discharge Date: 11/28/2022    Final Discharge Note (most recent)       Final Note - 11/28/22 1307          Final Note    Assessment Type Final Discharge Note     Anticipated Discharge Disposition Home or Self Care        Post-Acute Status    Discharge Delays None known at this time                     Important Message from Medicare

## 2022-11-28 NOTE — H&P
[facility] Emergency Dept.  [facility] Medicine  History & Physical    Patient Name: Russell Orellana  MRN: [id number]  Patient Class: OP- Observation  Admission Date: 11/27/2022  Attending Physician: Antonio Pacheco MD   Primary Care Provider: Primary Doctor No         Patient information was obtained from patient, caregiver / friend, past medical records and ER records.     Subjective:     Principal Problem:Type 2 diabetes mellitus with hyperglycemia, without long-term current use of insulin    Chief Complaint:   Chief Complaint   Patient presents with    Abdominal Pain     R side abd pain with nausea/vomiting and elevated blood sugar pt states he was seen 5 days ago for the same thing        HPI: Mr. Orellana is a 57-year-old  male (speaks somewhat English), PMH significant for non-insulin-dependent diabetes, hypertension, hyperlipidemia, noncompliant with medications, presented to the ED complaining of nausea, vomiting, abdominal pain.  Describes the pain as generalized, but more pronounced in the epigastric area.  Denies alcohol use.  Patient was seen in these ED four days ago with similar complaints, was treated in the ED and discharged to follow up with PCP as outpatient.  Due to the Thanksgiving holiday, patient did not get a chance to follow-up.  In the ED today abdominal ultrasound is negative for gallbladder pathology.  Laboratory workup reveals elevated blood sugar 393, beta hydroxybutyrate is mildly elevated 0.5, anion gap 17, CO2 21.  BP elevated due to pain.  Lipase within normal limits 19.    Admitting diagnosis:  Mild DKA, not requiring insulin drip.  Abdominal pain, N/V.      Past Medical History:   Diagnosis Date    Diabetes mellitus     High cholesterol     Hypertension        No past surgical history on file.    Review of patient's allergies indicates:  No Known Allergies    No current facility-administered medications on file prior to encounter.     Current Outpatient Medications on File Prior  to Encounter   Medication Sig    cloNIDine (CATAPRES) 0.1 MG tablet Take 1 tablet (0.1 mg total) by mouth 2 (two) times daily.    famotidine (PEPCID) 20 MG tablet Take 1 tablet (20 mg total) by mouth 2 (two) times daily as needed for Heartburn.    metFORMIN (GLUCOPHAGE) 1000 MG tablet Take 1 tablet (1,000 mg total) by mouth 2 (two) times daily with meals.    ondansetron (ZOFRAN-ODT) 4 MG TbDL Take 1 tablet (4 mg total) by mouth every 6 (six) hours as needed (nausea).     Family History       Problem Relation (Age of Onset)    No Known Problems Mother, Father          Tobacco Use    Smoking status: Never    Smokeless tobacco: Never   Substance and Sexual Activity    Alcohol use: Not Currently    Drug use: Never    Sexual activity: Not on file     Review of Systems   Constitutional: Negative.  Negative for chills and fever.   HENT: Negative.  Negative for congestion, rhinorrhea, sore throat and trouble swallowing.    Eyes: Negative.  Negative for visual disturbance.   Respiratory: Negative.  Negative for cough, shortness of breath and wheezing.    Cardiovascular: Negative.  Negative for chest pain and palpitations.   Gastrointestinal:  Positive for abdominal pain, nausea and vomiting. Negative for diarrhea.   Endocrine: Negative.    Genitourinary: Negative.  Negative for dysuria and flank pain.   Musculoskeletal: Negative.  Negative for back pain.   Skin: Negative.  Negative for rash.   Allergic/Immunologic: Negative.    Neurological: Negative.  Negative for speech difficulty, weakness, numbness and headaches.   Hematological: Negative.    Psychiatric/Behavioral: Negative.  Negative for hallucinations.    All other systems reviewed and are negative.  Objective:     Vital Signs (Most Recent):  Temp: 98.6 °F (37 °C) (11/27/22 1550)  Pulse: 101 (11/27/22 1943)  Resp: 18 (11/27/22 1958)  BP: (!) 240/113 (11/27/22 1943)  SpO2: 99 % (11/27/22 1943)   Vital Signs (24h Range):  Temp:  [98.6 °F (37 °C)] 98.6 °F (37  °C)  Pulse:  [] 101  Resp:  [18] 18  SpO2:  [98 %-99 %] 99 %  BP: (148-240)/() 240/113     Weight: 62.7 kg (138 lb 3.7 oz)  Body mass index is 23 kg/m².    Physical Exam  Vitals and nursing note reviewed.   Constitutional:       General: He is awake. He is not in acute distress.     Appearance: He is ill-appearing.   HENT:      Head: Normocephalic and atraumatic.      Mouth/Throat:      Mouth: Mucous membranes are moist.   Eyes:      General: No scleral icterus.     Conjunctiva/sclera: Conjunctivae normal.   Cardiovascular:      Rate and Rhythm: Regular rhythm. Tachycardia present.      Heart sounds: No murmur heard.  Pulmonary:      Effort: Pulmonary effort is normal. No respiratory distress.      Breath sounds: Normal breath sounds. No wheezing.   Abdominal:      Palpations: Abdomen is soft. There is no mass.      Tenderness: There is abdominal tenderness (epigastric). There is guarding. There is no rebound.   Musculoskeletal:         General: No swelling. Normal range of motion.      Cervical back: Normal range of motion and neck supple.   Skin:     General: Skin is warm.      Coloration: Skin is not jaundiced.   Neurological:      General: No focal deficit present.      Mental Status: He is alert and oriented to person, place, and time. Mental status is at baseline.   Psychiatric:         Attention and Perception: Attention normal.         Speech: Speech normal.         Behavior: Behavior is cooperative.           Significant Labs: All pertinent labs within the past 24 hours have been reviewed.  BMP:   Recent Labs   Lab 11/27/22  1652   *      K 4.4   CL 98   CO2 21*   BUN 21*   CREATININE 1.2   CALCIUM 10.0     CBC:   Recent Labs   Lab 11/27/22  1652   WBC 12.41   HGB 17.1   HCT 47.3        CMP:   Recent Labs   Lab 11/27/22  1652      K 4.4   CL 98   CO2 21*   *   BUN 21*   CREATININE 1.2   CALCIUM 10.0   PROT 8.4   ALBUMIN 4.5   BILITOT 0.8   ALKPHOS 79   AST 16    ALT 15   ANIONGAP 17*     Lipase:   Recent Labs   Lab 11/27/22  1652   LIPASE 19       Significant Imaging: I have reviewed all pertinent imaging results/findings within the past 24 hours.  I have reviewed and interpreted all pertinent imaging results/findings within the past 24 hours.    Imaging Results              US Abdomen Limited (Final result)  Result time 11/27/22 19:51:40      Final result by Jess Escobar MD (11/27/22 19:51:40)                   Impression:      No significant sonographic abnormality.      Electronically signed by: Shawn Mercado  Date:    11/27/2022  Time:    19:51               Narrative:    EXAMINATION:  US ABDOMEN LIMITED    CLINICAL HISTORY:  abd pain;    TECHNIQUE:  Limited ultrasound of the right upper quadrant of the abdomen (including pancreas, liver, gallbladder, common bile duct, and spleen) was performed.    COMPARISON:  None.    FINDINGS:  Liver: Normal in size, measuring 12.9 cm. Homogeneous echotexture. No focal hepatic lesions.    Gallbladder: No calculi, wall thickening, or pericholecystic fluid.  No sonographic Gibbs's sign.    Biliary system: The common duct is not dilated, measuring 3.2  mm.  No intrahepatic ductal dilatation.    Right kidney measures up to 9.1 cm without hydronephrosis    Miscellaneous: No upper abdominal ascites.                                      I have independently reviewed all pertinent labs within the past 24 hours.    I have independently reviewed, visualized and interpreted all pertinent imaging results within the past 24 hours and discussed the findings with the ED physician, Dr. Velasquez          Assessment/Plan:     * Type 2 diabetes mellitus with hyperglycemia, without long-term current use of insulin  Patient's FSGs are uncontrolled due to hyperglycemia on current medication regimen.  Last A1c reviewed- No results found for: LABA1C, HGBA1C  Most recent fingerstick glucose reviewed- Recent Labs   Lab 11/27/22  1550 11/27/22  1900  11/27/22 2011   POCTGLUCOSE 385* 292* 307*     Current correctional scale  High  Increase anti-hyperglycemic dose as follows-   Antihyperglycemics (From admission, onward)    Start     Stop Route Frequency Ordered    11/27/22 2124  insulin aspart U-100 pen 1-10 Units         -- SubQ Before meals & nightly PRN 11/27/22 2024        Hold Oral hypoglycemics while patient is in the hospital.    -mild DKA (blood sugar 373, anion gap 17, CO2 21, beta hydroxybutyric acid mildly elevated 0.5).    -no indication for insulin drip.    -continue NS at 150 cc/hour.    -high-dose insulin sliding scale.    -likely to improve with aggressive hydration.    Generalized abdominal pain  -likely secondary to mild DKA/hyperglycemia.    -lipase within normal limits.    -GB U/S negative for RUQ pathology.  -IV fluids and pain medications as needed.      Primary hypertension  -elevated due to pain.    -IV hydralazine and IV pain medications.    -re-evaluate in a.m..        VTE Risk Mitigation (From admission, onward)         Ordered     enoxaparin injection 40 mg  Daily         11/27/22 2023     Place sequential compression device  Until discontinued         11/27/22 2023                 The patient is placed in OBSERVATION status.      Antonio Pacheco MD  Department of Hospital Medicine   [facility] - Emergency Dept.

## 2022-11-28 NOTE — PLAN OF CARE
Patient remains free from  injury/falls, bed in lowest position, call light within reach. IV in place, VSS, and all active orders renewed and acknowledged. Patient aware of current care plan. No amendments at this time.     Problem: Adult Inpatient Plan of Care  Goal: Plan of Care Review  Outcome: Ongoing, Progressing  Goal: Patient-Specific Goal (Individualized)  Outcome: Ongoing, Progressing  Goal: Absence of Hospital-Acquired Illness or Injury  Outcome: Ongoing, Progressing  Goal: Optimal Comfort and Wellbeing  Outcome: Ongoing, Progressing  Goal: Readiness for Transition of Care  Outcome: Ongoing, Progressing     Problem: Diabetes Comorbidity  Goal: Blood Glucose Level Within Targeted Range  Outcome: Ongoing, Progressing     Problem: Pain Acute  Goal: Acceptable Pain Control and Functional Ability  Outcome: Ongoing, Progressing     Problem: Fall Injury Risk  Goal: Absence of Fall and Fall-Related Injury  Outcome: Ongoing, Progressing

## 2022-11-28 NOTE — HPI
Mr. Orellana is a 57-year-old  male (speaks somewhat English), PMH significant for non-insulin-dependent diabetes, hypertension, hyperlipidemia, noncompliant with medications, presented to the ED complaining of nausea, vomiting, abdominal pain.  Describes the pain as generalized, but more pronounced in the epigastric area.  Denies alcohol use.  Patient was seen in these ED four days ago with similar complaints, was treated in the ED and discharged to follow up with PCP as outpatient.  Due to the Thanksgiving holiday, patient did not get a chance to follow-up.  In the ED today abdominal ultrasound is negative for gallbladder pathology.  Laboratory workup reveals elevated blood sugar 393, beta hydroxybutyrate is mildly elevated 0.5, anion gap 17, CO2 21.  BP elevated due to pain.  Lipase within normal limits 19.    Admitting diagnosis:  Mild DKA, not requiring insulin drip.  Abdominal pain, N/V.

## 2022-11-28 NOTE — PLAN OF CARE
[facility] - Med Surg  Discharge Assessment    Primary Care Provider: Primary Doctor No     Discharge Assessment (most recent)       BRIEF DISCHARGE ASSESSMENT - 11/28/22 1317          Discharge Planning    Assessment Type Discharge Planning Brief Assessment     Resource/Environmental Concerns none     Support Systems Children     Equipment Currently Used at Home none     Current Living Arrangements home/apartment/condo     Patient/Family Anticipates Transition to home     Patient/Family Anticipated Services at Transition none     DME Needed Upon Discharge  none     Discharge Plan A Home

## 2022-11-28 NOTE — CONSULTS
Diabetes Education  Author: Rajani Asif, RN  Date: 11/28/2022       Jennifer #[id number] Georgian   Renetta #[id number] Georgian       Consulted for Hyperglycemia. Patient states he was told in 2010 he had pre diabetes and then went to a doctor in [place] about 2 years ago and that's when he was placed on metformin. However he only took metformin for about 2 months. He doesn't know why he really stopped. Patient states he had a glucometer at home put ran out of supplies and stopped taking his blood sugars. I gave patient a sample meter and supplies. Patient already knows how to use. I demonstrated insulin administration both with a syringe/vial and a pen. He doesn't have insulin so it may be cheaper for syringe /vial if patient is discharged home with insulin. Patient returned demonstration correctly. Handouts for meal planning and diabetic diet given to patient and patient verbalized understanding.                   Health Maintenance was reviewed today with patient. Discussed with patient importance of routine eye exams, foot exams/foot care, blood work (i.e.: A1c, microalbumin, and lipid), dental visits, yearly flu vaccine, and pneumonia vaccine as indicated by PCP. Patient verbalized understanding.     The patient has no Health Maintenance topics of status Not Due  Health Maintenance Due   Topic Date Due    Lipid Panel  Never done    Hemoglobin A1c  Never done    COVID-19 Vaccine (1) Never done    Diabetes Urine Screening  Never done    Pneumococcal Vaccines (Age 0-64) (1 - PCV) Never done    Foot Exam  Never done    Eye Exam  Never done    TETANUS VACCINE  Never done    Low Dose Statin  Never done    Colorectal Cancer Screening  Never done    Shingles Vaccine (1 of 2) Never done    Influenza Vaccine (1) Never done                                                                                                    Today's Self-Management Care Plan was developed with the patient's input and is based  on barriers identified during today's assessment.    The long and short-term goals in the care plan were written with the patient/caregiver's input. The patient has agreed to work toward these goals to improve his overall diabetes control.      The patient received a copy of today's self-management plan and verbalized understanding of the care plan, goals, and all of today's instructions.      The patient was encouraged to communicate with his physician and care team regarding his condition(s) and treatment.  I provided the patient with my contact information today and encouraged him to contact me via phone or patient portal as needed.

## 2022-11-28 NOTE — SUBJECTIVE & OBJECTIVE
Past Medical History:   Diagnosis Date    Diabetes mellitus     High cholesterol     Hypertension        No past surgical history on file.    Review of patient's allergies indicates:  No Known Allergies    No current facility-administered medications on file prior to encounter.     Current Outpatient Medications on File Prior to Encounter   Medication Sig    cloNIDine (CATAPRES) 0.1 MG tablet Take 1 tablet (0.1 mg total) by mouth 2 (two) times daily.    famotidine (PEPCID) 20 MG tablet Take 1 tablet (20 mg total) by mouth 2 (two) times daily as needed for Heartburn.    metFORMIN (GLUCOPHAGE) 1000 MG tablet Take 1 tablet (1,000 mg total) by mouth 2 (two) times daily with meals.    ondansetron (ZOFRAN-ODT) 4 MG TbDL Take 1 tablet (4 mg total) by mouth every 6 (six) hours as needed (nausea).     Family History       Problem Relation (Age of Onset)    No Known Problems Mother, Father          Tobacco Use    Smoking status: Never    Smokeless tobacco: Never   Substance and Sexual Activity    Alcohol use: Not Currently    Drug use: Never    Sexual activity: Not on file     Review of Systems   Constitutional: Negative.  Negative for chills and fever.   HENT: Negative.  Negative for congestion, rhinorrhea, sore throat and trouble swallowing.    Eyes: Negative.  Negative for visual disturbance.   Respiratory: Negative.  Negative for cough, shortness of breath and wheezing.    Cardiovascular: Negative.  Negative for chest pain and palpitations.   Gastrointestinal:  Positive for abdominal pain, nausea and vomiting. Negative for diarrhea.   Endocrine: Negative.    Genitourinary: Negative.  Negative for dysuria and flank pain.   Musculoskeletal: Negative.  Negative for back pain.   Skin: Negative.  Negative for rash.   Allergic/Immunologic: Negative.    Neurological: Negative.  Negative for speech difficulty, weakness, numbness and headaches.   Hematological: Negative.    Psychiatric/Behavioral: Negative.  Negative for  hallucinations.    All other systems reviewed and are negative.  Objective:     Vital Signs (Most Recent):  Temp: 98.6 °F (37 °C) (11/27/22 1550)  Pulse: 101 (11/27/22 1943)  Resp: 18 (11/27/22 1958)  BP: (!) 240/113 (11/27/22 1943)  SpO2: 99 % (11/27/22 1943)   Vital Signs (24h Range):  Temp:  [98.6 °F (37 °C)] 98.6 °F (37 °C)  Pulse:  [] 101  Resp:  [18] 18  SpO2:  [98 %-99 %] 99 %  BP: (148-240)/() 240/113     Weight: 62.7 kg (138 lb 3.7 oz)  Body mass index is 23 kg/m².    Physical Exam  Vitals and nursing note reviewed.   Constitutional:       General: He is awake. He is not in acute distress.     Appearance: He is ill-appearing.   HENT:      Head: Normocephalic and atraumatic.      Mouth/Throat:      Mouth: Mucous membranes are moist.   Eyes:      General: No scleral icterus.     Conjunctiva/sclera: Conjunctivae normal.   Cardiovascular:      Rate and Rhythm: Regular rhythm. Tachycardia present.      Heart sounds: No murmur heard.  Pulmonary:      Effort: Pulmonary effort is normal. No respiratory distress.      Breath sounds: Normal breath sounds. No wheezing.   Abdominal:      Palpations: Abdomen is soft. There is no mass.      Tenderness: There is abdominal tenderness (epigastric). There is guarding. There is no rebound.   Musculoskeletal:         General: No swelling. Normal range of motion.      Cervical back: Normal range of motion and neck supple.   Skin:     General: Skin is warm.      Coloration: Skin is not jaundiced.   Neurological:      General: No focal deficit present.      Mental Status: He is alert and oriented to person, place, and time. Mental status is at baseline.   Psychiatric:         Attention and Perception: Attention normal.         Speech: Speech normal.         Behavior: Behavior is cooperative.           Significant Labs: All pertinent labs within the past 24 hours have been reviewed.  BMP:   Recent Labs   Lab 11/27/22  1652   *      K 4.4   CL 98   CO2 21*    BUN 21*   CREATININE 1.2   CALCIUM 10.0     CBC:   Recent Labs   Lab 11/27/22  1652   WBC 12.41   HGB 17.1   HCT 47.3        CMP:   Recent Labs   Lab 11/27/22  1652      K 4.4   CL 98   CO2 21*   *   BUN 21*   CREATININE 1.2   CALCIUM 10.0   PROT 8.4   ALBUMIN 4.5   BILITOT 0.8   ALKPHOS 79   AST 16   ALT 15   ANIONGAP 17*     Lipase:   Recent Labs   Lab 11/27/22  1652   LIPASE 19       Significant Imaging: I have reviewed all pertinent imaging results/findings within the past 24 hours.  I have reviewed and interpreted all pertinent imaging results/findings within the past 24 hours.    Imaging Results              US Abdomen Limited (Final result)  Result time 11/27/22 19:51:40      Final result by Jess Escobar MD (11/27/22 19:51:40)                   Impression:      No significant sonographic abnormality.      Electronically signed by: Shawn Mercado  Date:    11/27/2022  Time:    19:51               Narrative:    EXAMINATION:  US ABDOMEN LIMITED    CLINICAL HISTORY:  abd pain;    TECHNIQUE:  Limited ultrasound of the right upper quadrant of the abdomen (including pancreas, liver, gallbladder, common bile duct, and spleen) was performed.    COMPARISON:  None.    FINDINGS:  Liver: Normal in size, measuring 12.9 cm. Homogeneous echotexture. No focal hepatic lesions.    Gallbladder: No calculi, wall thickening, or pericholecystic fluid.  No sonographic Gibbs's sign.    Biliary system: The common duct is not dilated, measuring 3.2  mm.  No intrahepatic ductal dilatation.    Right kidney measures up to 9.1 cm without hydronephrosis    Miscellaneous: No upper abdominal ascites.                                      I have independently reviewed all pertinent labs within the past 24 hours.    I have independently reviewed, visualized and interpreted all pertinent imaging results within the past 24 hours and discussed the findings with the ED physician, Dr. Velasquez

## 2022-11-28 NOTE — ED PROVIDER NOTES
SCRIBE #1 NOTE: I, Marylin Patel, am scribing for, and in the presence of, Randy Velasquez Jr., MD. I have scribed the entire note.       History     Chief Complaint   Patient presents with    Abdominal Pain     R side abd pain with nausea/vomiting and elevated blood sugar pt states he was seen 5 days ago for the same thing     Review of patient's allergies indicates:  No Known Allergies      History of Present Illness     HPI    11/27/2022, 7:07 PM  History obtained from the patient      History of Present Illness: Russell Orellana is a 57 y.o. male patient with a PMHx of diabetes who presents to the Emergency Department for evaluation of RUQ abdominal pain  which onset today. Pt was seen here by Dr. Grande four days ago with the same complaint. Pt has not been taking blood pressure meds, he says they make him vomit. Symptoms are constant and moderate in severity. No mitigating or exacerbating factors reported. Associated sxs include N/V. Patient denies any fever, chills, CP, SOB, dysuria, and all other sxs at this time. Prior Tx includes metformin and Clonidine. No further complaints or concerns at this time.       Arrival mode: Personal vehicle     PCP: Primary Doctor No        Past Medical History:  Past Medical History:   Diagnosis Date    Diabetes mellitus     High cholesterol     Hypertension        Past Surgical History:  History reviewed. No pertinent surgical history.      Family History:  Family History   Problem Relation Age of Onset    No Known Problems Mother     No Known Problems Father        Social History:  Social History     Tobacco Use    Smoking status: Never    Smokeless tobacco: Never   Substance and Sexual Activity    Alcohol use: Not Currently    Drug use: Never    Sexual activity: Not on file        Review of Systems     Review of Systems   Constitutional:  Negative for chills and fever.   HENT:  Negative for congestion and sore throat.    Eyes:  Negative for pain and visual disturbance.    Respiratory:  Negative for shortness of breath.    Cardiovascular:  Negative for chest pain.   Gastrointestinal:  Positive for abdominal pain, nausea and vomiting.   Genitourinary:  Negative for dysuria and hematuria.   Musculoskeletal:  Negative for back pain and neck pain.   Skin:  Negative for rash and wound.   Neurological:  Negative for weakness and numbness.   All other systems reviewed and are negative.     Physical Exam     Initial Vitals [11/27/22 1550]   BP Pulse Resp Temp SpO2   (!) 148/85 98 18 98.6 °F (37 °C) 98 %      MAP       --          Physical Exam  Nursing Notes and Vital Signs Reviewed.  Constitutional: Patient is in no acute distress. Well-developed and well-nourished.  Head: Atraumatic. Normocephalic.  Eyes:  EOM intact. Conjunctivae are not pale. No scleral icterus.  ENT: Mucous membranes are dry. Oropharynx is clear and symmetric.    Neck: Supple. Full ROM.   Cardiovascular: Regular rate. Regular rhythm. No murmurs, rubs, or gallops. Distal pulses are 2+ and symmetric.  Pulmonary/Chest: No respiratory distress. Clear to auscultation bilaterally. No wheezing or rales.  Abdominal: Soft and non-distended.  epigastric, RUQ tenderness.  No rebound, guarding, or rigidity.   Musculoskeletal: Moves all extremities. No obvious deformities. No edema.   Skin: Warm and dry.  Neurological:  Alert, awake, and appropriate.  Normal speech.  No acute focal neurological deficits are appreciated.  Psychiatric: Normal affect. Good eye contact. Appropriate in content.     ED Course   Procedures  ED Vital Signs:  Vitals:    11/28/22 0017 11/28/22 0420 11/28/22 0818 11/28/22 1118   BP: 114/66 122/65 138/73 (!) 169/80   Pulse: 93 75 74 73   Resp: 18 18 20 17   Temp: 98.3 °F (36.8 °C) 98.2 °F (36.8 °C) 98.8 °F (37.1 °C) 98.3 °F (36.8 °C)   TempSrc: Oral Oral  Oral   SpO2: 97% 97% 98% 98%   Weight: 64.5 kg (142 lb 3.2 oz)      Height:        11/28/22 1551 11/28/22 1927 11/28/22 2316 11/29/22 0333   BP: (!) 164/78  (!) 143/75 (!) 167/80 (!) 148/67   Pulse: 69 72 71 72   Resp: 17 18 18 18   Temp: 98.5 °F (36.9 °C) 98.5 °F (36.9 °C) 98.1 °F (36.7 °C) 98.1 °F (36.7 °C)   TempSrc: Oral Oral Oral Oral   SpO2: 96% 98% 98% 98%   Weight:   65.4 kg (144 lb 2.9 oz) 66 kg (145 lb 8.1 oz)   Height:        11/29/22 0747 11/29/22 0807 11/29/22 0912 11/29/22 1006   BP: (!) 240/116  (!) 235/109 (!) 202/93   Pulse: 86  87    Resp: 18 16     Temp: 97.9 °F (36.6 °C)      TempSrc: Oral      SpO2: 97%      Weight:       Height:        11/29/22 1140 11/29/22 1206 11/29/22 1319   BP:  (!) 117/59 (!) 160/75   Pulse:  86 105   Resp: 20 17 18   Temp:  98.2 °F (36.8 °C) 98 °F (36.7 °C)   TempSrc:  Oral Oral   SpO2:  95% 97%   Weight:      Height:          Abnormal Lab Results:  Labs Reviewed   CBC W/ AUTO DIFFERENTIAL - Abnormal; Notable for the following components:       Result Value    MCHC 36.2 (*)     RDW 11.3 (*)     Gran # (ANC) 11.2 (*)     Immature Grans (Abs) 0.05 (*)     Lymph # 0.8 (*)     Gran % 90.6 (*)     Lymph % 6.4 (*)     Mono % 2.3 (*)     All other components within normal limits   COMPREHENSIVE METABOLIC PANEL - Abnormal; Notable for the following components:    CO2 21 (*)     Glucose 393 (*)     BUN 21 (*)     Anion Gap 17 (*)     All other components within normal limits   BETA - HYDROXYBUTYRATE, SERUM - Abnormal; Notable for the following components:    Beta-Hydroxybutyrate 0.9 (*)     All other components within normal limits   URINALYSIS, REFLEX TO URINE CULTURE - Abnormal; Notable for the following components:    Color, UA Colorless (*)     Protein, UA 1+ (*)     Glucose, UA 4+ (*)     Ketones, UA 2+ (*)     All other components within normal limits    Narrative:     Specimen Source->Urine   POCT GLUCOSE - Abnormal; Notable for the following components:    POCT Glucose 385 (*)     All other components within normal limits   POCT GLUCOSE - Abnormal; Notable for the following components:    POCT Glucose 292 (*)     All other  components within normal limits   POCT GLUCOSE - Abnormal; Notable for the following components:    POCT Glucose 307 (*)     All other components within normal limits   LIPASE   HEP C VIRUS HOLD SPECIMEN    Narrative:     Release to patient->Immediate   DRUG SCREEN PANEL, URINE EMERGENCY    Narrative:     Specimen Source->Urine   URINALYSIS MICROSCOPIC    Narrative:     Specimen Source->Urine   HIV 1 / 2 ANTIBODY   HEPATITIS C ANTIBODY        All Lab Results:  Results for orders placed or performed during the hospital encounter of 11/27/22   CBC auto differential   Result Value Ref Range    WBC 12.41 3.90 - 12.70 K/uL    RBC 5.63 4.60 - 6.20 M/uL    Hemoglobin 17.1 14.0 - 18.0 g/dL    Hematocrit 47.3 40.0 - 54.0 %    MCV 84 82 - 98 fL    MCH 30.4 27.0 - 31.0 pg    MCHC 36.2 (H) 32.0 - 36.0 g/dL    RDW 11.3 (L) 11.5 - 14.5 %    Platelets 197 150 - 450 K/uL    MPV 11.0 9.2 - 12.9 fL    Immature Granulocytes 0.4 0.0 - 0.5 %    Gran # (ANC) 11.2 (H) 1.8 - 7.7 K/uL    Immature Grans (Abs) 0.05 (H) 0.00 - 0.04 K/uL    Lymph # 0.8 (L) 1.0 - 4.8 K/uL    Mono # 0.3 0.3 - 1.0 K/uL    Eos # 0.0 0.0 - 0.5 K/uL    Baso # 0.04 0.00 - 0.20 K/uL    nRBC 0 0 /100 WBC    Gran % 90.6 (H) 38.0 - 73.0 %    Lymph % 6.4 (L) 18.0 - 48.0 %    Mono % 2.3 (L) 4.0 - 15.0 %    Eosinophil % 0.0 0.0 - 8.0 %    Basophil % 0.3 0.0 - 1.9 %    Differential Method Automated    Comprehensive metabolic panel   Result Value Ref Range    Sodium 136 136 - 145 mmol/L    Potassium 4.4 3.5 - 5.1 mmol/L    Chloride 98 95 - 110 mmol/L    CO2 21 (L) 23 - 29 mmol/L    Glucose 393 (H) 70 - 110 mg/dL    BUN 21 (H) 6 - 20 mg/dL    Creatinine 1.2 0.5 - 1.4 mg/dL    Calcium 10.0 8.7 - 10.5 mg/dL    Total Protein 8.4 6.0 - 8.4 g/dL    Albumin 4.5 3.5 - 5.2 g/dL    Total Bilirubin 0.8 0.1 - 1.0 mg/dL    Alkaline Phosphatase 79 55 - 135 U/L    AST 16 10 - 40 U/L    ALT 15 10 - 44 U/L    Anion Gap 17 (H) 8 - 16 mmol/L    eGFR >60 >60 mL/min/1.73 m^2   Lipase   Result  Value Ref Range    Lipase 19 4 - 60 U/L   Beta - Hydroxybutyrate, Serum   Result Value Ref Range    Beta-Hydroxybutyrate 0.9 (H) 0.0 - 0.5 mmol/L   HCV Virus Hold Specimen   Result Value Ref Range    HEP C Virus Hold Specimen Hold for HCV sendout    Drug screen panel, emergency   Result Value Ref Range    Benzodiazepines Negative Negative    Methadone metabolites Negative Negative    Cocaine (Metab.) Negative Negative    Opiate Scrn, Ur Negative Negative    Barbiturate Screen, Ur Negative Negative    Amphetamine Screen, Ur Negative Negative    THC Negative Negative    Phencyclidine Negative Negative    Creatinine, Urine 25.9 23.0 - 375.0 mg/dL    Toxicology Information SEE COMMENT    Urinalysis, Reflex to Urine Culture Urine, Clean Catch    Specimen: Urine   Result Value Ref Range    Specimen UA Urine, Clean Catch     Color, UA Colorless (A) Yellow, Straw, Mery    Appearance, UA Clear Clear    pH, UA 8.0 5.0 - 8.0    Specific Gravity, UA 1.020 1.005 - 1.030    Protein, UA 1+ (A) Negative    Glucose, UA 4+ (A) Negative    Ketones, UA 2+ (A) Negative    Bilirubin (UA) Negative Negative    Occult Blood UA Negative Negative    Nitrite, UA Negative Negative    Urobilinogen, UA Negative <2.0 EU/dL    Leukocytes, UA Negative Negative   Urinalysis Microscopic   Result Value Ref Range    RBC, UA 3 0 - 4 /hpf    WBC, UA 1 0 - 5 /hpf    Bacteria None None-Occ /hpf    Yeast, UA None None    Hyaline Casts, UA 0 0-1/lpf /lpf    Microscopic Comment SEE COMMENT    CBC Auto Differential   Result Value Ref Range    WBC 11.03 3.90 - 12.70 K/uL    RBC 4.92 4.60 - 6.20 M/uL    Hemoglobin 14.7 14.0 - 18.0 g/dL    Hematocrit 42.3 40.0 - 54.0 %    MCV 86 82 - 98 fL    MCH 29.9 27.0 - 31.0 pg    MCHC 34.8 32.0 - 36.0 g/dL    RDW 11.7 11.5 - 14.5 %    Platelets 209 150 - 450 K/uL    MPV 11.0 9.2 - 12.9 fL    Immature Granulocytes 0.4 0.0 - 0.5 %    Gran # (ANC) 7.6 1.8 - 7.7 K/uL    Immature Grans (Abs) 0.04 0.00 - 0.04 K/uL    Lymph # 2.3  1.0 - 4.8 K/uL    Mono # 1.0 0.3 - 1.0 K/uL    Eos # 0.1 0.0 - 0.5 K/uL    Baso # 0.04 0.00 - 0.20 K/uL    nRBC 0 0 /100 WBC    Gran % 68.9 38.0 - 73.0 %    Lymph % 20.5 18.0 - 48.0 %    Mono % 9.3 4.0 - 15.0 %    Eosinophil % 0.5 0.0 - 8.0 %    Basophil % 0.4 0.0 - 1.9 %    Differential Method Automated    Magnesium   Result Value Ref Range    Magnesium 2.0 1.6 - 2.6 mg/dL   Comprehensive Metabolic Panel   Result Value Ref Range    Sodium 137 136 - 145 mmol/L    Potassium 3.7 3.5 - 5.1 mmol/L    Chloride 105 95 - 110 mmol/L    CO2 21 (L) 23 - 29 mmol/L    Glucose 203 (H) 70 - 110 mg/dL    BUN 22 (H) 6 - 20 mg/dL    Creatinine 1.1 0.5 - 1.4 mg/dL    Calcium 8.2 (L) 8.7 - 10.5 mg/dL    Total Protein 6.5 6.0 - 8.4 g/dL    Albumin 3.5 3.5 - 5.2 g/dL    Total Bilirubin 0.6 0.1 - 1.0 mg/dL    Alkaline Phosphatase 54 (L) 55 - 135 U/L    AST 14 10 - 40 U/L    ALT 15 10 - 44 U/L    Anion Gap 11 8 - 16 mmol/L    eGFR >60 >60 mL/min/1.73 m^2   Hemoglobin A1c   Result Value Ref Range    Hemoglobin A1C 10.5 (H) 4.0 - 5.6 %    Estimated Avg Glucose 255 (H) 68 - 131 mg/dL   POCT glucose   Result Value Ref Range    POCT Glucose 385 (H) 70 - 110 mg/dL   POCT glucose   Result Value Ref Range    POCT Glucose 292 (H) 70 - 110 mg/dL   POCT glucose   Result Value Ref Range    POCT Glucose 307 (H) 70 - 110 mg/dL   POCT glucose   Result Value Ref Range    POCT Glucose 175 (H) 70 - 110 mg/dL   POCT glucose   Result Value Ref Range    POCT Glucose 122 (H) 70 - 110 mg/dL   POCT glucose   Result Value Ref Range    POCT Glucose 119 (H) 70 - 110 mg/dL   POCT glucose   Result Value Ref Range    POCT Glucose 105 70 - 110 mg/dL   POCT glucose   Result Value Ref Range    POCT Glucose 169 (H) 70 - 110 mg/dL   POCT glucose   Result Value Ref Range    POCT Glucose 223 (H) 70 - 110 mg/dL   POCT glucose   Result Value Ref Range    POCT Glucose 259 (H) 70 - 110 mg/dL         Imaging Results:  Imaging Results              US Abdomen Limited (Final result)   Result time 11/27/22 19:51:40      Final result by Jess Escobar MD (11/27/22 19:51:40)                   Impression:      No significant sonographic abnormality.      Electronically signed by: Shawn Mercado  Date:    11/27/2022  Time:    19:51               Narrative:    EXAMINATION:  US ABDOMEN LIMITED    CLINICAL HISTORY:  abd pain;    TECHNIQUE:  Limited ultrasound of the right upper quadrant of the abdomen (including pancreas, liver, gallbladder, common bile duct, and spleen) was performed.    COMPARISON:  None.    FINDINGS:  Liver: Normal in size, measuring 12.9 cm. Homogeneous echotexture. No focal hepatic lesions.    Gallbladder: No calculi, wall thickening, or pericholecystic fluid.  No sonographic Gibbs's sign.    Biliary system: The common duct is not dilated, measuring 3.2  mm.  No intrahepatic ductal dilatation.    Right kidney measures up to 9.1 cm without hydronephrosis    Miscellaneous: No upper abdominal ascites.                                                The Emergency Provider reviewed the vital signs and test results, which are outlined above.     ED Discussion       7:45 PM: Discussed case with Matt Pacheco MD ([facility]). Dr. Pacheco agrees with current care and management of pt and accepts admission.   Admitting Service: Hospital Medicine  Admitting Physician: Dr. Pacheco  Admit to: Obs med surg    7:46 PM: Re-evaluated pt. I have discussed test results, shared treatment plan, and the need for admission with patient and family at bedside. Pt and family express understanding at this time and agree with all information. All questions answered. Pt and family have no further questions or concerns at this time. Pt is ready for admit.         Medical Decision Making:   Clinical Tests:   Lab Tests: Ordered and Reviewed  Radiological Study: Ordered and Reviewed         ED Medication(s):  Medications   0.9%  NaCl infusion (0 mL/hr Intravenous Stopped 11/28/22 2017)   0.9%  NaCl infusion (0  mLs Intravenous Stopped 11/27/22 1920)   ondansetron injection 8 mg (8 mg Intravenous Given 11/27/22 1656)   sodium chloride 0.9% bolus 1,000 mL (0 mLs Intravenous Stopped 11/27/22 2000)   insulin regular injection 5 Units 0.05 mL (5 Units Intravenous Given 11/27/22 1906)   morphine injection 4 mg (4 mg Intravenous Given 11/27/22 1958)   morphine injection 2 mg (2 mg Intravenous Given 11/29/22 0807)   aluminum-magnesium hydroxide-simethicone 200-200-20 mg/5 mL suspension 30 mL (30 mLs Oral Given 11/29/22 1038)     And   LIDOcaine HCl 2% oral solution 15 mL (15 mLs Oral Given 11/29/22 1038)   HYDROmorphone (PF) injection 0.5 mg (0.5 mg Intravenous Given 11/29/22 1140)   0.9%  NaCl infusion ( Intravenous Stopped 11/29/22 1526)   iohexoL (OMNIPAQUE 350) injection 100 mL (100 mLs Intravenous Given 11/29/22 1238)       Discharge Medication List as of 11/29/2022  3:20 PM        START taking these medications    Details   amLODIPine (NORVASC) 5 MG tablet Take 1 tablet (5 mg total) by mouth once daily., Starting Tue 11/29/2022, Until Thu 12/29/2022, Normal      insulin aspart protamine-insulin aspart (NOVOLOG 70/30) 100 unit/mL (70-30) InPn pen Inject 5 Units into the skin 2 (two) times daily before meals., Starting Tue 11/29/2022, Until Thu 12/29/2022, Normal      lisinopriL (PRINIVIL,ZESTRIL) 20 MG tablet Take 1 tablet (20 mg total) by mouth once daily., Starting Wed 11/30/2022, Until Fri 12/30/2022, Normal              Follow-up Information       [facility] Follow up.    Why: Hospital Follow Up and to establish PCP care  Contact information:  [address]  [place] [address]  [phone]               [facility] Follow up.    Why: Hospital Follow Up and to establish PCP care  Contact information:  [address] [facility] [address]  [phone]                                 Scribe Attestation:   Scribe #1: I performed the above scribed service and the documentation accurately  describes the services I performed. I attest to the accuracy of the note.     Attending:   Physician Attestation Statement for Scribe #1: I, Randy Velasquez Jr., MD, personally performed the services described in this documentation, as scribed by Marylin Patel, in my presence, and it is both accurate and complete.           Clinical Impression       ICD-10-CM ICD-9-CM   1. Type 2 diabetes mellitus with hyperglycemia, without long-term current use of insulin  E11.65 250.00     790.29   2. Hyperglycemia  R73.9 790.29   3. Abdominal pain, unspecified abdominal location  R10.9 789.00       Disposition:   Disposition: Placed in Observation  Condition: Fair       Randy Velasquez Jr., MD  12/06/22 7858

## 2022-11-28 NOTE — ASSESSMENT & PLAN NOTE
-likely secondary to mild DKA/hyperglycemia.    -lipase within normal limits.    -GB U/S negative for RUQ pathology.  -IV fluids and pain medications as needed.

## 2022-11-28 NOTE — ASSESSMENT & PLAN NOTE
Patient's FSGs are uncontrolled due to hyperglycemia on current medication regimen.  Last A1c reviewed- No results found for: LABA1C, HGBA1C  Most recent fingerstick glucose reviewed- Recent Labs   Lab 11/27/22  1550 11/27/22  1900 11/27/22 2011   POCTGLUCOSE 385* 292* 307*     Current correctional scale  High  Increase anti-hyperglycemic dose as follows-   Antihyperglycemics (From admission, onward)    Start     Stop Route Frequency Ordered    11/27/22 2124  insulin aspart U-100 pen 1-10 Units         -- SubQ Before meals & nightly PRN 11/27/22 2024        Hold Oral hypoglycemics while patient is in the hospital.    -mild DKA (blood sugar 373, anion gap 17, CO2 21, beta hydroxybutyric acid mildly elevated 0.5).    -no indication for insulin drip.    -continue NS at 150 cc/hour.    -high-dose insulin sliding scale.    -likely to improve with aggressive hydration.

## 2022-11-29 VITALS
OXYGEN SATURATION: 97 % | HEART RATE: 105 BPM | BODY MASS INDEX: 24.24 KG/M2 | DIASTOLIC BLOOD PRESSURE: 75 MMHG | SYSTOLIC BLOOD PRESSURE: 160 MMHG | WEIGHT: 145.5 LBS | HEIGHT: 65 IN | RESPIRATION RATE: 18 BRPM | TEMPERATURE: 98 F

## 2022-11-29 PROBLEM — R10.84 GENERALIZED ABDOMINAL PAIN: Status: RESOLVED | Noted: 2022-11-27 | Resolved: 2022-11-29

## 2022-11-29 LAB
POCT GLUCOSE: 169 MG/DL (ref 70–110)
POCT GLUCOSE: 223 MG/DL (ref 70–110)
POCT GLUCOSE: 259 MG/DL (ref 70–110)

## 2022-11-29 PROCEDURE — 96375 TX/PRO/DX INJ NEW DRUG ADDON: CPT

## 2022-11-29 PROCEDURE — 25000003 PHARM REV CODE 250: Performed by: HOSPITALIST

## 2022-11-29 PROCEDURE — 63600175 PHARM REV CODE 636 W HCPCS: Performed by: STUDENT IN AN ORGANIZED HEALTH CARE EDUCATION/TRAINING PROGRAM

## 2022-11-29 PROCEDURE — 96361 HYDRATE IV INFUSION ADD-ON: CPT

## 2022-11-29 PROCEDURE — G0378 HOSPITAL OBSERVATION PER HR: HCPCS

## 2022-11-29 PROCEDURE — 63600175 PHARM REV CODE 636 W HCPCS: Performed by: NURSE PRACTITIONER

## 2022-11-29 PROCEDURE — 25500020 PHARM REV CODE 255: Performed by: HOSPITALIST

## 2022-11-29 PROCEDURE — 96372 THER/PROPH/DIAG INJ SC/IM: CPT | Performed by: NURSE PRACTITIONER

## 2022-11-29 PROCEDURE — 63600175 PHARM REV CODE 636 W HCPCS: Performed by: INTERNAL MEDICINE

## 2022-11-29 PROCEDURE — 96372 THER/PROPH/DIAG INJ SC/IM: CPT | Mod: 59 | Performed by: INTERNAL MEDICINE

## 2022-11-29 PROCEDURE — 25000003 PHARM REV CODE 250: Performed by: NURSE PRACTITIONER

## 2022-11-29 PROCEDURE — 96376 TX/PRO/DX INJ SAME DRUG ADON: CPT

## 2022-11-29 RX ORDER — LISINOPRIL 20 MG/1
20 TABLET ORAL DAILY
Status: DISCONTINUED | OUTPATIENT
Start: 2022-11-29 | End: 2022-11-29 | Stop reason: HOSPADM

## 2022-11-29 RX ORDER — MORPHINE SULFATE 2 MG/ML
2 INJECTION, SOLUTION INTRAMUSCULAR; INTRAVENOUS ONCE
Status: COMPLETED | OUTPATIENT
Start: 2022-11-29 | End: 2022-11-29

## 2022-11-29 RX ORDER — INSULIN ASPART 100 [IU]/ML
5 INJECTION, SUSPENSION SUBCUTANEOUS
Qty: 3 ML | Refills: 1 | Status: ON HOLD | OUTPATIENT
Start: 2022-11-29 | End: 2023-05-21 | Stop reason: SDUPTHER

## 2022-11-29 RX ORDER — LIDOCAINE HYDROCHLORIDE 20 MG/ML
15 SOLUTION OROPHARYNGEAL ONCE
Status: COMPLETED | OUTPATIENT
Start: 2022-11-29 | End: 2022-11-29

## 2022-11-29 RX ORDER — MAG HYDROX/ALUMINUM HYD/SIMETH 200-200-20
30 SUSPENSION, ORAL (FINAL DOSE FORM) ORAL ONCE
Status: COMPLETED | OUTPATIENT
Start: 2022-11-29 | End: 2022-11-29

## 2022-11-29 RX ORDER — SODIUM CHLORIDE 9 MG/ML
INJECTION, SOLUTION INTRAVENOUS ONCE
Status: COMPLETED | OUTPATIENT
Start: 2022-11-29 | End: 2022-11-29

## 2022-11-29 RX ORDER — CLONIDINE HYDROCHLORIDE 0.1 MG/1
0.1 TABLET ORAL 2 TIMES DAILY
Status: DISCONTINUED | OUTPATIENT
Start: 2022-11-29 | End: 2022-11-29

## 2022-11-29 RX ORDER — AMLODIPINE BESYLATE 5 MG/1
5 TABLET ORAL DAILY
Qty: 30 TABLET | Refills: 1 | Status: ON HOLD | OUTPATIENT
Start: 2022-11-29 | End: 2023-05-21 | Stop reason: HOSPADM

## 2022-11-29 RX ORDER — FAMOTIDINE 20 MG/1
20 TABLET, FILM COATED ORAL DAILY
Status: DISCONTINUED | OUTPATIENT
Start: 2022-11-29 | End: 2022-11-29 | Stop reason: DRUGHIGH

## 2022-11-29 RX ORDER — FAMOTIDINE 20 MG/1
20 TABLET, FILM COATED ORAL 2 TIMES DAILY
Status: DISCONTINUED | OUTPATIENT
Start: 2022-11-29 | End: 2022-11-29 | Stop reason: HOSPADM

## 2022-11-29 RX ORDER — LISINOPRIL 20 MG/1
20 TABLET ORAL DAILY
Qty: 30 TABLET | Refills: 1 | Status: ON HOLD | OUTPATIENT
Start: 2022-11-30 | End: 2023-05-21 | Stop reason: HOSPADM

## 2022-11-29 RX ORDER — INSULIN ASPART 100 [IU]/ML
5 INJECTION, SUSPENSION SUBCUTANEOUS
Status: DISCONTINUED | OUTPATIENT
Start: 2022-11-29 | End: 2022-11-29 | Stop reason: HOSPADM

## 2022-11-29 RX ORDER — HYDROMORPHONE HYDROCHLORIDE 2 MG/ML
0.5 INJECTION, SOLUTION INTRAMUSCULAR; INTRAVENOUS; SUBCUTANEOUS ONCE
Status: COMPLETED | OUTPATIENT
Start: 2022-11-29 | End: 2022-11-29

## 2022-11-29 RX ORDER — FAMOTIDINE 20 MG/1
20 TABLET, FILM COATED ORAL 2 TIMES DAILY
Qty: 60 TABLET | Refills: 0 | Status: ON HOLD | OUTPATIENT
Start: 2022-11-29 | End: 2023-05-21 | Stop reason: HOSPADM

## 2022-11-29 RX ORDER — CLONIDINE 0.1 MG/24H
1 PATCH, EXTENDED RELEASE TRANSDERMAL
Status: DISCONTINUED | OUTPATIENT
Start: 2022-11-29 | End: 2022-11-29 | Stop reason: HOSPADM

## 2022-11-29 RX ADMIN — SODIUM CHLORIDE: 0.9 INJECTION, SOLUTION INTRAVENOUS at 11:11

## 2022-11-29 RX ADMIN — CLONIDINE 1 PATCH: 0.1 PATCH TRANSDERMAL at 11:11

## 2022-11-29 RX ADMIN — FAMOTIDINE 20 MG: 20 TABLET ORAL at 09:11

## 2022-11-29 RX ADMIN — MORPHINE SULFATE 2 MG: 2 INJECTION, SOLUTION INTRAMUSCULAR; INTRAVENOUS at 08:11

## 2022-11-29 RX ADMIN — INSULIN ASPART 5 UNITS: 100 INJECTION, SUSPENSION SUBCUTANEOUS at 09:11

## 2022-11-29 RX ADMIN — ONDANSETRON 8 MG: 2 INJECTION INTRAMUSCULAR; INTRAVENOUS at 07:11

## 2022-11-29 RX ADMIN — INSULIN ASPART 6 UNITS: 100 INJECTION, SOLUTION INTRAVENOUS; SUBCUTANEOUS at 11:11

## 2022-11-29 RX ADMIN — LISINOPRIL 20 MG: 20 TABLET ORAL at 08:11

## 2022-11-29 RX ADMIN — HYDROMORPHONE HYDROCHLORIDE 0.5 MG: 2 INJECTION INTRAMUSCULAR; INTRAVENOUS; SUBCUTANEOUS at 11:11

## 2022-11-29 RX ADMIN — ALUMINUM HYDROXIDE, MAGNESIUM HYDROXIDE, AND SIMETHICONE 30 ML: 200; 200; 20 SUSPENSION ORAL at 10:11

## 2022-11-29 RX ADMIN — HYDRALAZINE HYDROCHLORIDE 10 MG: 20 INJECTION, SOLUTION INTRAMUSCULAR; INTRAVENOUS at 09:11

## 2022-11-29 RX ADMIN — INSULIN ASPART 2 UNITS: 100 INJECTION, SOLUTION INTRAVENOUS; SUBCUTANEOUS at 06:11

## 2022-11-29 RX ADMIN — IOHEXOL 100 ML: 350 INJECTION, SOLUTION INTRAVENOUS at 12:11

## 2022-11-29 RX ADMIN — LIDOCAINE HYDROCHLORIDE 15 ML: 20 SOLUTION ORAL; TOPICAL at 10:11

## 2022-11-29 NOTE — PLAN OF CARE
Email sent to [facility] to screen for medicaid. Added clinics with sliding scale to S for PCP follow up.

## 2022-11-29 NOTE — SUBJECTIVE & OBJECTIVE
Interval History: no acute events overnight. Continue SSI, A1C pending.     Review of Systems   Constitutional:  Negative for chills and fever.   HENT:  Negative for congestion.    Respiratory:  Negative for cough.    Cardiovascular:  Negative for chest pain.   Gastrointestinal:  Negative for abdominal pain, nausea and vomiting.   Objective:     Vital Signs (Most Recent):  Temp: 98.5 °F (36.9 °C) (11/28/22 1551)  Pulse: 69 (11/28/22 1551)  Resp: 17 (11/28/22 1551)  BP: (!) 164/78 (11/28/22 1551)  SpO2: 96 % (11/28/22 1551)   Vital Signs (24h Range):  Temp:  [98.2 °F (36.8 °C)-98.8 °F (37.1 °C)] 98.5 °F (36.9 °C)  Pulse:  [] 69  Resp:  [17-20] 17  SpO2:  [96 %-99 %] 96 %  BP: (114-240)/() 164/78     Weight: 64.5 kg (142 lb 3.2 oz)  Body mass index is 23.66 kg/m².    Intake/Output Summary (Last 24 hours) at 11/28/2022 1826  Last data filed at 11/27/2022 2000  Gross per 24 hour   Intake 1999 ml   Output --   Net 1999 ml      Physical Exam  Constitutional:       General: He is not in acute distress.     Appearance: He is well-developed.   HENT:      Head: Normocephalic and atraumatic.   Eyes:      Conjunctiva/sclera: Conjunctivae normal.      Pupils: Pupils are equal, round, and reactive to light.   Neck:      Vascular: No JVD.   Cardiovascular:      Rate and Rhythm: Normal rate and regular rhythm.      Heart sounds: Normal heart sounds.   Pulmonary:      Effort: Pulmonary effort is normal. No respiratory distress.      Breath sounds: Normal breath sounds. No wheezing.   Abdominal:      General: Bowel sounds are normal. There is no distension.      Palpations: Abdomen is soft.      Tenderness: There is no abdominal tenderness. There is no guarding.   Musculoskeletal:         General: No tenderness. Normal range of motion.      Cervical back: Normal range of motion and neck supple.   Skin:     General: Skin is warm and dry.      Capillary Refill: Capillary refill takes less than 2 seconds.      Findings: No  erythema.   Neurological:      Mental Status: He is alert and oriented to person, place, and time.   Psychiatric:         Behavior: Behavior normal.       Significant Labs: All pertinent labs within the past 24 hours have been reviewed.  CBC:   Recent Labs   Lab 11/27/22 1652 11/28/22  0532   WBC 12.41 11.03   HGB 17.1 14.7   HCT 47.3 42.3    209     CMP:   Recent Labs   Lab 11/27/22 1652 11/28/22  0532    137   K 4.4 3.7   CL 98 105   CO2 21* 21*   * 203*   BUN 21* 22*   CREATININE 1.2 1.1   CALCIUM 10.0 8.2*   PROT 8.4 6.5   ALBUMIN 4.5 3.5   BILITOT 0.8 0.6   ALKPHOS 79 54*   AST 16 14   ALT 15 15   ANIONGAP 17* 11     Urine Studies:   Recent Labs   Lab 11/27/22 2046   COLORU Colorless*   APPEARANCEUA Clear   PHUR 8.0   SPECGRAV 1.020   PROTEINUA 1+*   GLUCUA 4+*   KETONESU 2+*   BILIRUBINUA Negative   OCCULTUA Negative   NITRITE Negative   UROBILINOGEN Negative   LEUKOCYTESUR Negative   RBCUA 3   WBCUA 1   BACTERIA None   HYALINECASTS 0       Significant Imaging: I have reviewed all pertinent imaging results/findings within the past 24 hours.

## 2022-11-29 NOTE — PLAN OF CARE
Call button within reach. Blood sugar monitored per MD order. No complaints of pain during shift. Active orders reviewed.  Problem: Adult Inpatient Plan of Care  Goal: Plan of Care Review  Outcome: Ongoing, Progressing  Goal: Patient-Specific Goal (Individualized)  Outcome: Ongoing, Progressing  Goal: Absence of Hospital-Acquired Illness or Injury  Outcome: Ongoing, Progressing  Goal: Optimal Comfort and Wellbeing  Outcome: Ongoing, Progressing  Goal: Readiness for Transition of Care  Outcome: Ongoing, Progressing     Problem: Diabetes Comorbidity  Goal: Blood Glucose Level Within Targeted Range  Outcome: Ongoing, Progressing     Problem: Pain Acute  Goal: Acceptable Pain Control and Functional Ability  Outcome: Ongoing, Progressing     Problem: Fall Injury Risk  Goal: Absence of Fall and Fall-Related Injury  Outcome: Ongoing, Progressing

## 2022-11-29 NOTE — ASSESSMENT & PLAN NOTE
Patient's FSGs are uncontrolled due to hyperglycemia on current medication regimen.  Last A1c reviewed- No results found for: LABA1C, HGBA1C  Most recent fingerstick glucose reviewed-   Recent Labs   Lab 11/27/22  2011 11/28/22  0557 11/28/22  1155 11/28/22  1658   POCTGLUCOSE 307* 175* 122* 119*     Current correctional scale  High  Increase anti-hyperglycemic dose as follows-   Antihyperglycemics (From admission, onward)    Start     Stop Route Frequency Ordered    11/28/22 0800  insulin aspart U-100 pen 5 Units         -- SubQ 3 times daily with meals 11/28/22 0659    11/27/22 2124  insulin aspart U-100 pen 1-10 Units         -- SubQ Before meals & nightly PRN 11/27/22 2024        Hold Oral hypoglycemics while patient is in the hospital.    -mild DKA (blood sugar 373, anion gap 17, CO2 21, beta hydroxybutyric acid mildly elevated 0.5).    -no indication for insulin drip.    -continue NS at 150 cc/hour.    -high-dose insulin sliding scale.    -likely to improve with aggressive hydration.    11/28  Blood sugars improved  A1C pending  Case management consulted discharge planning

## 2022-11-29 NOTE — NURSING
Pt being discharged Home in stable condition. IV removed, catheter intact, pt tolerated well.  Discharge instructions given to pt via [employer] [facility], ID #[id number], pt verbalized understanding.

## 2022-11-29 NOTE — ASSESSMENT & PLAN NOTE
-likely secondary to mild DKA/hyperglycemia.    -lipase within normal limits.    -GB U/S negative for RUQ pathology.  -IV fluids and pain medications as needed.    Resolved   Advance diet as tolerated

## 2022-11-29 NOTE — NURSING
Pt in severe abdominal pain. He states that after he received his sliding scale insulin this morning in the [facility] is when the pain started and that is where the pain is. He is also nauseous and PRN Zofran given. Pt unable to stay still in bed. /112. Provider Felisa AMBROCIO, NP notified. Orders placed.

## 2022-11-29 NOTE — NURSING
Pt still in severe pain rated 10/10. Still restless and unable to remain still. . One dose of Morphine given and no relief obtained. Pt received PO lisinopril and repeat Repeat /109. PRN hydralazine given now. Provider Felisa AMBROCIO NP notified. No new orders placed.

## 2022-11-29 NOTE — ASSESSMENT & PLAN NOTE
-elevated due to pain.    -IV hydralazine and IV pain medications.    -re-evaluate in a.m..    Improved

## 2022-11-29 NOTE — PROGRESS NOTES
[facility] Medicine  Progress Note    Patient Name: Russell Orellana  MRN: [id number]  Patient Class: OP- Observation   Admission Date: 11/27/2022  Length of Stay: 0 days  Attending Physician: Mark Johnston MD  Primary Care Provider: Primary Doctor No        Subjective:     Principal Problem:Type 2 diabetes mellitus with hyperglycemia, without long-term current use of insulin        HPI:  Mr. Orellana is a 57-year-old  male (speaks somewhat English), PMH significant for non-insulin-dependent diabetes, hypertension, hyperlipidemia, noncompliant with medications, presented to the ED complaining of nausea, vomiting, abdominal pain.  Describes the pain as generalized, but more pronounced in the epigastric area.  Denies alcohol use.  Patient was seen in these ED four days ago with similar complaints, was treated in the ED and discharged to follow up with PCP as outpatient.  Due to the Thanksgiving holiday, patient did not get a chance to follow-up.  In the ED today abdominal ultrasound is negative for gallbladder pathology.  Laboratory workup reveals elevated blood sugar 393, beta hydroxybutyrate is mildly elevated 0.5, anion gap 17, CO2 21.  BP elevated due to pain.  Lipase within normal limits 19.    Admitting diagnosis:  Mild DKA, not requiring insulin drip.  Abdominal pain, N/V.      Overview/Hospital Course:  56 yo male admitted with mild DKA, uncontrolled DM. A1C pending. Blood sugars improved with IV insulin and fluid resuscitation. Diabetes educator consulted. Case management consulted for dc planning, arrangement of hospital f/u.       Interval History: no acute events overnight. Continue SSI, A1C pending.     Review of Systems   Constitutional:  Negative for chills and fever.   HENT:  Negative for congestion.    Respiratory:  Negative for cough.    Cardiovascular:  Negative for chest pain.   Gastrointestinal:  Negative for abdominal pain, nausea and vomiting.   Objective:     Vital Signs  (Most Recent):  Temp: 98.5 °F (36.9 °C) (11/28/22 1551)  Pulse: 69 (11/28/22 1551)  Resp: 17 (11/28/22 1551)  BP: (!) 164/78 (11/28/22 1551)  SpO2: 96 % (11/28/22 1551)   Vital Signs (24h Range):  Temp:  [98.2 °F (36.8 °C)-98.8 °F (37.1 °C)] 98.5 °F (36.9 °C)  Pulse:  [] 69  Resp:  [17-20] 17  SpO2:  [96 %-99 %] 96 %  BP: (114-240)/() 164/78     Weight: 64.5 kg (142 lb 3.2 oz)  Body mass index is 23.66 kg/m².    Intake/Output Summary (Last 24 hours) at 11/28/2022 1826  Last data filed at 11/27/2022 2000  Gross per 24 hour   Intake 1999 ml   Output --   Net 1999 ml      Physical Exam  Constitutional:       General: He is not in acute distress.     Appearance: He is well-developed.   HENT:      Head: Normocephalic and atraumatic.   Eyes:      Conjunctiva/sclera: Conjunctivae normal.      Pupils: Pupils are equal, round, and reactive to light.   Neck:      Vascular: No JVD.   Cardiovascular:      Rate and Rhythm: Normal rate and regular rhythm.      Heart sounds: Normal heart sounds.   Pulmonary:      Effort: Pulmonary effort is normal. No respiratory distress.      Breath sounds: Normal breath sounds. No wheezing.   Abdominal:      General: Bowel sounds are normal. There is no distension.      Palpations: Abdomen is soft.      Tenderness: There is no abdominal tenderness. There is no guarding.   Musculoskeletal:         General: No tenderness. Normal range of motion.      Cervical back: Normal range of motion and neck supple.   Skin:     General: Skin is warm and dry.      Capillary Refill: Capillary refill takes less than 2 seconds.      Findings: No erythema.   Neurological:      Mental Status: He is alert and oriented to person, place, and time.   Psychiatric:         Behavior: Behavior normal.       Significant Labs: All pertinent labs within the past 24 hours have been reviewed.  CBC:   Recent Labs   Lab 11/27/22  1652 11/28/22  0532   WBC 12.41 11.03   HGB 17.1 14.7   HCT 47.3 42.3    209      CMP:   Recent Labs   Lab 11/27/22  1652 11/28/22  0532    137   K 4.4 3.7   CL 98 105   CO2 21* 21*   * 203*   BUN 21* 22*   CREATININE 1.2 1.1   CALCIUM 10.0 8.2*   PROT 8.4 6.5   ALBUMIN 4.5 3.5   BILITOT 0.8 0.6   ALKPHOS 79 54*   AST 16 14   ALT 15 15   ANIONGAP 17* 11     Urine Studies:   Recent Labs   Lab 11/27/22 2046   COLORU Colorless*   APPEARANCEUA Clear   PHUR 8.0   SPECGRAV 1.020   PROTEINUA 1+*   GLUCUA 4+*   KETONESU 2+*   BILIRUBINUA Negative   OCCULTUA Negative   NITRITE Negative   UROBILINOGEN Negative   LEUKOCYTESUR Negative   RBCUA 3   WBCUA 1   BACTERIA None   HYALINECASTS 0       Significant Imaging: I have reviewed all pertinent imaging results/findings within the past 24 hours.      Assessment/Plan:      * Type 2 diabetes mellitus with hyperglycemia, without long-term current use of insulin  Patient's FSGs are uncontrolled due to hyperglycemia on current medication regimen.  Last A1c reviewed- No results found for: LABA1C, HGBA1C  Most recent fingerstick glucose reviewed-   Recent Labs   Lab 11/27/22 2011 11/28/22  0557 11/28/22  1155 11/28/22  1658   POCTGLUCOSE 307* 175* 122* 119*     Current correctional scale  High  Increase anti-hyperglycemic dose as follows-   Antihyperglycemics (From admission, onward)    Start     Stop Route Frequency Ordered    11/28/22 0800  insulin aspart U-100 pen 5 Units         -- SubQ 3 times daily with meals 11/28/22 0659    11/27/22 2124  insulin aspart U-100 pen 1-10 Units         -- SubQ Before meals & nightly PRN 11/27/22 2024        Hold Oral hypoglycemics while patient is in the hospital.    -mild DKA (blood sugar 373, anion gap 17, CO2 21, beta hydroxybutyric acid mildly elevated 0.5).    -no indication for insulin drip.    -continue NS at 150 cc/hour.    -high-dose insulin sliding scale.    -likely to improve with aggressive hydration.    11/28  Blood sugars improved  A1C pending  Case management consulted discharge planning      Primary hypertension  -elevated due to pain.    -IV hydralazine and IV pain medications.    -re-evaluate in a.m..    Improved       Generalized abdominal pain  -likely secondary to mild DKA/hyperglycemia.    -lipase within normal limits.    -GB U/S negative for RUQ pathology.  -IV fluids and pain medications as needed.    Resolved   Advance diet as tolerated         VTE Risk Mitigation (From admission, onward)         Ordered     enoxaparin injection 40 mg  Daily         11/27/22 2023     Place sequential compression device  Until discontinued         11/27/22 2023                Discharge Planning   KARLA: 11/29/22    Code Status: Not on file   Is the patient medically ready for discharge?:     Reason for patient still in hospital (select all that apply): Patient trending condition, Treatment and Pending disposition  Discharge Plan A: Home   Discharge Delays: None known at this time              Felisa Schmitz NP  Department of Hospital Medicine   [facility]

## 2022-11-29 NOTE — PROGRESS NOTES
Pharmacist Renal Dose Adjustment Note    Russell Orellana is a 57 y.o. male being treated with the medication famotidine.     Patient Data:    Vital Signs (Most Recent):  Temp: 97.9 °F (36.6 °C) (11/29/22 0747)  Pulse: 86 (11/29/22 0747)  Resp: 18 (11/29/22 0747)  BP: (!) 148/67 (11/29/22 0333)  SpO2: 97 % (11/29/22 0747)   Vital Signs (72h Range):  Temp:  [97.9 °F (36.6 °C)-98.8 °F (37.1 °C)]   Pulse:  []   Resp:  [17-20]   BP: (114-240)/()   SpO2:  [96 %-99 %]      Recent Labs   Lab 11/22/22  1524 11/27/22  1652 11/28/22  0532   CREATININE 1.2 1.2 1.1     Serum creatinine: 1.1 mg/dL 11/28/22 0532  Estimated creatinine clearance: 64.5 mL/min    Medication:famotidine 20 mg PO daily will be changed to famotidine 20 mg PO BID per pharmacy renal dose adjustment protocol for patients with CrCl greater than 50 mL/min.    Pharmacist's Name: Lolita Ivory PharmD  Pharmacist's Extension: [phone]    Thank you for allowing us to participate in this patient's care.     Lolita Ivory PharmD 11/29/2022 8:04 AM

## 2022-11-29 NOTE — HOSPITAL COURSE
58 yo male admitted with diffuse abdominal pain, mild DKA, uncontrolled DM. A1C 10.5. Blood sugars improved with IV insulin and fluid resuscitation. Diabetes educator consulted. Patient will continue metformin 1000 mg BID. Novolin 70/30 5 units BID added to medication regimen. ACE and CCB initiated for BP control. Abdominal pain resolved, CT abdomen shows no acute findings. Case management consulted for dc planning, diabetes supplies provided. Recommend follow up with primary care within 2-3 days.

## 2022-11-29 NOTE — PLAN OF CARE
Pt remains free from falls/injuries this shift. Safety precautions maintained. Pain managed with pain medication. No s/s of acute distress noted. VSS. IVF infusing. BP controlled with PRN medications. Continuing with plan of care. Chart check completed, all orders reviewed.

## 2022-11-29 NOTE — NURSING
Repeat BP after hydralazine 202/93. Pt still thrashing in pain, unable to sit still, and rocking back and forth in bed. Pain rated 10/10. Abdomen not distended, only tender to the LLQ. Providers Felisa AMBROCIO NP and Dr. Johnston notified. New orders placed.

## 2022-11-30 NOTE — ASSESSMENT & PLAN NOTE
Patient's FSGs are uncontrolled due to hyperglycemia on current medication regimen.  Last A1c reviewed-   Lab Results   Component Value Date    HGBA1C 10.5 (H) 11/28/2022     Most recent fingerstick glucose reviewed-   Recent Labs   Lab 11/28/22  2148 11/29/22  0559 11/29/22  0904 11/29/22  1059   POCTGLUCOSE 105 169* 223* 259*     Current correctional scale  High  Increase anti-hyperglycemic dose as follows-   Antihyperglycemics (From admission, onward)    None        Hold Oral hypoglycemics while patient is in the hospital.    -mild DKA (blood sugar 373, anion gap 17, CO2 21, beta hydroxybutyric acid mildly elevated 0.5).    -no indication for insulin drip.    -continue NS at 150 cc/hour.    -high-dose insulin sliding scale.    -likely to improve with aggressive hydration.    11/28  Blood sugars improved  A1C pending  Case management consulted discharge planning     11/29  A1C 10.5  Continue metformin 1000 mg BID   Start novolog 70/30 5 units BID  Diabetic diet

## 2022-11-30 NOTE — DISCHARGE SUMMARY
[facility] Medicine  Discharge Summary      Patient Name: Russell Orellana  MRN: [id number]  [facility]: [id number]  Patient Class: OP- Observation  Admission Date: 11/27/2022  Hospital Length of Stay: 0 days  Discharge Date and Time: 11/29/2022  4:16 PM  Attending Physician: Tara att. providers found   Discharging Provider: Felisa Schmitz NP  Primary Care Provider: Primary Doctor Tara    Primary Care Team: Networked reference to record PCT     HPI:   Mr. Orellana is a 57-year-old  male (speaks somewhat English), PMH significant for non-insulin-dependent diabetes, hypertension, hyperlipidemia, noncompliant with medications, presented to the ED complaining of nausea, vomiting, abdominal pain.  Describes the pain as generalized, but more pronounced in the epigastric area.  Denies alcohol use.  Patient was seen in these ED four days ago with similar complaints, was treated in the ED and discharged to follow up with PCP as outpatient.  Due to the Thanksgiving holiday, patient did not get a chance to follow-up.  In the ED today abdominal ultrasound is negative for gallbladder pathology.  Laboratory workup reveals elevated blood sugar 393, beta hydroxybutyrate is mildly elevated 0.5, anion gap 17, CO2 21.  BP elevated due to pain.  Lipase within normal limits 19.    Admitting diagnosis:  Mild DKA, not requiring insulin drip.  Abdominal pain, N/V.      * No surgery found *      Hospital Course:   58 yo male admitted with diffuse abdominal pain, mild DKA, uncontrolled DM. A1C 10.5. Blood sugars improved with IV insulin and fluid resuscitation. Diabetes educator consulted. Patient will continue metformin 1000 mg BID. Novolin 70/30 5 units BID added to medication regimen. ACE and CCB initiated for BP control. Abdominal pain resolved, CT abdomen shows no acute findings. Case management consulted for dc planning, diabetes supplies provided. Recommend follow up with primary care within 2-3 days.         Goals of  Care Treatment Preferences:         Consults:   Consults (From admission, onward)        Status Ordering Provider     Inpatient consult to Diabetes educator  Once        Provider:  (Not yet assigned)    Completed LORENZO WILLIS     Inpatient consult to Diabetes educator  Once        Provider:  (Not yet assigned)    Completed DANGELO LINARES          * Type 2 diabetes mellitus with hyperglycemia, without long-term current use of insulin  Patient's FSGs are uncontrolled due to hyperglycemia on current medication regimen.  Last A1c reviewed-   Lab Results   Component Value Date    HGBA1C 10.5 (H) 11/28/2022     Most recent fingerstick glucose reviewed-   Recent Labs   Lab 11/28/22  2148 11/29/22  0559 11/29/22  0904 11/29/22  1059   POCTGLUCOSE 105 169* 223* 259*     Current correctional scale  High  Increase anti-hyperglycemic dose as follows-   Antihyperglycemics (From admission, onward)    None        Hold Oral hypoglycemics while patient is in the hospital.    -mild DKA (blood sugar 373, anion gap 17, CO2 21, beta hydroxybutyric acid mildly elevated 0.5).    -no indication for insulin drip.    -continue NS at 150 cc/hour.    -high-dose insulin sliding scale.    -likely to improve with aggressive hydration.    11/28  Blood sugars improved  A1C pending  Case management consulted discharge planning     11/29  A1C 10.5  Continue metformin 1000 mg BID   Start novolog 70/30 5 units BID  Diabetic diet         Final Active Diagnoses:    Diagnosis Date Noted POA    PRINCIPAL PROBLEM:  Type 2 diabetes mellitus with hyperglycemia, without long-term current use of insulin [E11.65] 11/27/2022 Yes    Primary hypertension [I10] 11/27/2022 Yes      Problems Resolved During this Admission:    Diagnosis Date Noted Date Resolved POA    Generalized abdominal pain [R10.84] 11/27/2022 11/29/2022 Yes       Discharged Condition: stable    Disposition: Home or Self Care    Follow Up:   Follow-up Information     [facility]  "Clinic Follow up.    Why: Hospital Follow Up and to establish PCP care  Contact information:  [address]  [place] [address]  [phone]             [facility] Follow up.    Why: Hospital Follow Up and to establish PCP care  Contact information:  [address] [facility] [address] [address]  [phone]                       Patient Instructions:      DIABETIC SUPPLIES FOR HOME USE     Order Specific Question Answer Comments   Height: 5' 5" (1.651 m)    Weight: 66 kg (145 lb 8.1 oz)    Does patient have medical equipment at home? none    Length of need (1-99 months): 99    Is the Patient insulin dependent? Yes    How many times each day does your Patient test his or her glucose level? TID    Do you follow the Patient at least every 6 months for Management of Diabetes? Yes        Significant Diagnostic Studies: Labs:   CMP   Recent Labs   Lab 11/28/22  0532      K 3.7      CO2 21*   *   BUN 22*   CREATININE 1.1   CALCIUM 8.2*   PROT 6.5   ALBUMIN 3.5   BILITOT 0.6   ALKPHOS 54*   AST 14   ALT 15   ANIONGAP 11    and CBC   Recent Labs   Lab 11/28/22  0532   WBC 11.03   HGB 14.7   HCT 42.3          Pending Diagnostic Studies:     Procedure Component Value Units Date/Time    HIV 1/2 Ag/Ab (4th Gen) [[id number]] Collected: 11/27/22 1901    Order Status: Sent Lab Status: In process Updated: 11/27/22 1903    Specimen: Blood     Hepatitis C Antibody [[id number]] Collected: 11/27/22 1901    Order Status: Sent Lab Status: In process Updated: 11/27/22 1903    Specimen: Blood          Medications:  Reconciled Home Medications:      Medication List      START taking these medications    amLODIPine 5 MG tablet  Commonly known as: NORVASC  [place] levon tableta (5 mg en total) por vía oral diariamente.  (Take 1 tablet (5 mg total) by mouth once daily.)     lisinopriL 20 MG tablet  Commonly known as: PRINIVIL,ZESTRIL  [place] levon tableta (20 mg en total) por vía oral diariamente.  (Take 1 tablet " (20 mg total) by mouth once daily.)  Start taking on: November 30, 2022     NovoLOG Mix 70-30FlexPen U-100 100 unit/mL (70-30) Inpn pen  Generic drug: insulin aspart protamine-insulin aspart  Inject 5 Units into the skin 2 (two) times daily before meals.        CHANGE how you take these medications    famotidine 20 MG tablet  Commonly known as: PEPCID  [place] levon tableta (20 mg en total) por vía oral 2 veces al día.  (Take 1 tablet (20 mg total) by mouth 2 (two) times daily.)  What changed:   · when to take this  · reasons to take this        CONTINUE taking these medications    metFORMIN 1000 MG tablet  Commonly known as: GLUCOPHAGE  Take 1 tablet (1,000 mg total) by mouth 2 (two) times daily with meals.     ondansetron 4 MG Tbdl  Commonly known as: ZOFRAN-ODT  Take 1 tablet (4 mg total) by mouth every 6 (six) hours as needed (nausea).        STOP taking these medications    cloNIDine 0.1 MG tablet  Commonly known as: CATAPRES            Indwelling Lines/Drains at time of discharge:   Lines/Drains/Airways     None                 Time spent on the discharge of patient: 30 minutes         Felisa Schmitz NP  Department of Hospital Medicine  [facility]

## 2023-05-18 ENCOUNTER — HOSPITAL ENCOUNTER (INPATIENT)
Facility: HOSPITAL | Age: 58
LOS: 3 days | Discharge: HOME | DRG: 074 | End: 2023-05-21
Attending: EMERGENCY MEDICINE | Admitting: INTERNAL MEDICINE
Payer: MEDICAID

## 2023-05-18 DIAGNOSIS — E11.65 TYPE 2 DIABETES MELLITUS WITH HYPERGLYCEMIA, WITH LONG-TERM CURRENT USE OF INSULIN: ICD-10-CM

## 2023-05-18 DIAGNOSIS — I16.0 HYPERTENSIVE URGENCY: ICD-10-CM

## 2023-05-18 DIAGNOSIS — Z79.4 TYPE 2 DIABETES MELLITUS WITH HYPERGLYCEMIA, WITH LONG-TERM CURRENT USE OF INSULIN: ICD-10-CM

## 2023-05-18 DIAGNOSIS — I10 ESSENTIAL HYPERTENSION: ICD-10-CM

## 2023-05-18 DIAGNOSIS — R11.2 INTRACTABLE NAUSEA AND VOMITING: Primary | ICD-10-CM

## 2023-05-18 DIAGNOSIS — E86.0 DEHYDRATION: ICD-10-CM

## 2023-05-18 DIAGNOSIS — R07.9 CHEST PAIN: ICD-10-CM

## 2023-05-18 DIAGNOSIS — R00.0 TACHYCARDIA: ICD-10-CM

## 2023-05-18 LAB
ALBUMIN SERPL BCP-MCNC: 4.6 G/DL (ref 3.5–5.2)
ALP SERPL-CCNC: 71 U/L (ref 55–135)
ALT SERPL W/O P-5'-P-CCNC: 23 U/L (ref 10–44)
ANION GAP SERPL CALC-SCNC: 19 MMOL/L (ref 8–16)
AST SERPL-CCNC: 39 U/L (ref 10–40)
BACTERIA #/AREA URNS HPF: ABNORMAL /HPF
BASOPHILS # BLD AUTO: 0.03 K/UL (ref 0–0.2)
BASOPHILS NFR BLD: 0.3 % (ref 0–1.9)
BILIRUB SERPL-MCNC: 1.3 MG/DL (ref 0.1–1)
BILIRUB UR QL STRIP: NEGATIVE
BUN SERPL-MCNC: 34 MG/DL (ref 6–20)
CALCIUM SERPL-MCNC: 9.4 MG/DL (ref 8.7–10.5)
CHLORIDE SERPL-SCNC: 92 MMOL/L (ref 95–110)
CLARITY UR: CLEAR
CO2 SERPL-SCNC: 21 MMOL/L (ref 23–29)
COLOR UR: YELLOW
CREAT SERPL-MCNC: 1.3 MG/DL (ref 0.5–1.4)
DIFFERENTIAL METHOD: ABNORMAL
EOSINOPHIL # BLD AUTO: 0 K/UL (ref 0–0.5)
EOSINOPHIL NFR BLD: 0 % (ref 0–8)
ERYTHROCYTE [DISTWIDTH] IN BLOOD BY AUTOMATED COUNT: 11.9 % (ref 11.5–14.5)
EST. GFR  (NO RACE VARIABLE): >60 ML/MIN/1.73 M^2
GLUCOSE SERPL-MCNC: 313 MG/DL (ref 70–110)
GLUCOSE UR QL STRIP: ABNORMAL
HCT VFR BLD AUTO: 46.4 % (ref 40–54)
HGB BLD-MCNC: 16.5 G/DL (ref 14–18)
HGB UR QL STRIP: ABNORMAL
HYALINE CASTS #/AREA URNS LPF: 0 /LPF
IMM GRANULOCYTES # BLD AUTO: 0.04 K/UL (ref 0–0.04)
IMM GRANULOCYTES NFR BLD AUTO: 0.4 % (ref 0–0.5)
KETONES UR QL STRIP: ABNORMAL
LEUKOCYTE ESTERASE UR QL STRIP: NEGATIVE
LIPASE SERPL-CCNC: 19 U/L (ref 4–60)
LYMPHOCYTES # BLD AUTO: 0.9 K/UL (ref 1–4.8)
LYMPHOCYTES NFR BLD: 9.1 % (ref 18–48)
MCH RBC QN AUTO: 29.5 PG (ref 27–31)
MCHC RBC AUTO-ENTMCNC: 35.6 G/DL (ref 32–36)
MCV RBC AUTO: 83 FL (ref 82–98)
MICROSCOPIC COMMENT: ABNORMAL
MONOCYTES # BLD AUTO: 0.5 K/UL (ref 0.3–1)
MONOCYTES NFR BLD: 4.5 % (ref 4–15)
NEUTROPHILS # BLD AUTO: 8.8 K/UL (ref 1.8–7.7)
NEUTROPHILS NFR BLD: 85.7 % (ref 38–73)
NITRITE UR QL STRIP: NEGATIVE
NRBC BLD-RTO: 0 /100 WBC
PH UR STRIP: 8 [PH] (ref 5–8)
PLATELET # BLD AUTO: 206 K/UL (ref 150–450)
PMV BLD AUTO: 10.7 FL (ref 9.2–12.9)
POTASSIUM SERPL-SCNC: 4.5 MMOL/L (ref 3.5–5.1)
PROT SERPL-MCNC: 8.3 G/DL (ref 6–8.4)
PROT UR QL STRIP: ABNORMAL
RBC # BLD AUTO: 5.6 M/UL (ref 4.6–6.2)
RBC #/AREA URNS HPF: 5 /HPF (ref 0–4)
SODIUM SERPL-SCNC: 132 MMOL/L (ref 136–145)
SP GR UR STRIP: 1.02 (ref 1–1.03)
URN SPEC COLLECT METH UR: ABNORMAL
UROBILINOGEN UR STRIP-ACNC: NEGATIVE EU/DL
WBC # BLD AUTO: 10.28 K/UL (ref 3.9–12.7)
WBC #/AREA URNS HPF: 0 /HPF (ref 0–5)
YEAST URNS QL MICRO: ABNORMAL

## 2023-05-18 PROCEDURE — 81000 URINALYSIS NONAUTO W/SCOPE: CPT | Mod: 59 | Performed by: PHYSICIAN ASSISTANT

## 2023-05-18 PROCEDURE — 11000001 HC ACUTE MED/SURG PRIVATE ROOM

## 2023-05-18 PROCEDURE — 25000003 PHARM REV CODE 250: Performed by: EMERGENCY MEDICINE

## 2023-05-18 PROCEDURE — 99285 EMERGENCY DEPT VISIT HI MDM: CPT | Mod: 25

## 2023-05-18 PROCEDURE — 63600175 PHARM REV CODE 636 W HCPCS: Performed by: PHYSICIAN ASSISTANT

## 2023-05-18 PROCEDURE — 80307 DRUG TEST PRSMV CHEM ANLYZR: CPT | Performed by: EMERGENCY MEDICINE

## 2023-05-18 PROCEDURE — 63600175 PHARM REV CODE 636 W HCPCS: Performed by: EMERGENCY MEDICINE

## 2023-05-18 PROCEDURE — 96361 HYDRATE IV INFUSION ADD-ON: CPT

## 2023-05-18 PROCEDURE — 96375 TX/PRO/DX INJ NEW DRUG ADDON: CPT

## 2023-05-18 PROCEDURE — 80053 COMPREHEN METABOLIC PANEL: CPT | Performed by: PHYSICIAN ASSISTANT

## 2023-05-18 PROCEDURE — 82962 GLUCOSE BLOOD TEST: CPT

## 2023-05-18 PROCEDURE — 96365 THER/PROPH/DIAG IV INF INIT: CPT

## 2023-05-18 PROCEDURE — 83690 ASSAY OF LIPASE: CPT | Performed by: PHYSICIAN ASSISTANT

## 2023-05-18 PROCEDURE — 85025 COMPLETE CBC W/AUTO DIFF WBC: CPT | Performed by: PHYSICIAN ASSISTANT

## 2023-05-18 RX ORDER — MORPHINE SULFATE 4 MG/ML
2 INJECTION, SOLUTION INTRAMUSCULAR; INTRAVENOUS
Status: COMPLETED | OUTPATIENT
Start: 2023-05-18 | End: 2023-05-18

## 2023-05-18 RX ORDER — ONDANSETRON 2 MG/ML
4 INJECTION INTRAMUSCULAR; INTRAVENOUS
Status: COMPLETED | OUTPATIENT
Start: 2023-05-18 | End: 2023-05-18

## 2023-05-18 RX ORDER — ONDANSETRON 2 MG/ML
INJECTION INTRAMUSCULAR; INTRAVENOUS
Status: DISPENSED
Start: 2023-05-18 | End: 2023-05-19

## 2023-05-18 RX ADMIN — MORPHINE SULFATE 2 MG: 4 INJECTION INTRAVENOUS at 11:05

## 2023-05-18 RX ADMIN — ONDANSETRON 4 MG: 2 INJECTION INTRAMUSCULAR; INTRAVENOUS at 08:05

## 2023-05-18 RX ADMIN — SODIUM CHLORIDE, POTASSIUM CHLORIDE, SODIUM LACTATE AND CALCIUM CHLORIDE 1000 ML: 600; 310; 30; 20 INJECTION, SOLUTION INTRAVENOUS at 11:05

## 2023-05-18 RX ADMIN — SODIUM CHLORIDE, POTASSIUM CHLORIDE, SODIUM LACTATE AND CALCIUM CHLORIDE 1000 ML: 600; 310; 30; 20 INJECTION, SOLUTION INTRAVENOUS at 08:05

## 2023-05-18 RX ADMIN — PROMETHAZINE HYDROCHLORIDE 12.5 MG: 25 INJECTION INTRAMUSCULAR; INTRAVENOUS at 11:05

## 2023-05-18 RX ADMIN — IOHEXOL 100 ML: 350 INJECTION, SOLUTION INTRAVENOUS at 11:05

## 2023-05-19 PROBLEM — I16.0 HYPERTENSIVE URGENCY: Status: ACTIVE | Noted: 2022-11-27

## 2023-05-19 PROBLEM — R11.2 INTRACTABLE NAUSEA AND VOMITING: Status: ACTIVE | Noted: 2023-05-19

## 2023-05-19 PROBLEM — E87.29 INCREASED ANION GAP METABOLIC ACIDOSIS: Status: ACTIVE | Noted: 2023-05-19

## 2023-05-19 PROBLEM — Z79.4 TYPE 2 DIABETES MELLITUS WITH HYPERGLYCEMIA, WITH LONG-TERM CURRENT USE OF INSULIN: Status: ACTIVE | Noted: 2022-11-27

## 2023-05-19 PROBLEM — R10.9 ABDOMINAL PAIN: Status: ACTIVE | Noted: 2022-11-27

## 2023-05-19 LAB
ALLENS TEST: ABNORMAL
AMPHET+METHAMPHET UR QL: NEGATIVE
BARBITURATES UR QL SCN>200 NG/ML: NEGATIVE
BASOPHILS # BLD AUTO: 0.03 K/UL (ref 0–0.2)
BASOPHILS NFR BLD: 0.2 % (ref 0–1.9)
BENZODIAZ UR QL SCN>200 NG/ML: NEGATIVE
BZE UR QL SCN: NEGATIVE
CANNABINOIDS UR QL SCN: NEGATIVE
CREAT UR-MCNC: 16.1 MG/DL (ref 23–375)
DELSYS: ABNORMAL
DIFFERENTIAL METHOD: ABNORMAL
EOSINOPHIL # BLD AUTO: 0 K/UL (ref 0–0.5)
EOSINOPHIL NFR BLD: 0 % (ref 0–8)
ERYTHROCYTE [DISTWIDTH] IN BLOOD BY AUTOMATED COUNT: 11.9 % (ref 11.5–14.5)
ESTIMATED AVG GLUCOSE: 235 MG/DL (ref 68–131)
FIO2: 21
HBA1C MFR BLD: 9.8 % (ref 4–5.6)
HCO3 UR-SCNC: 32.6 MMOL/L (ref 24–28)
HCT VFR BLD AUTO: 45 % (ref 40–54)
HGB BLD-MCNC: 15.9 G/DL (ref 14–18)
IMM GRANULOCYTES # BLD AUTO: 0.04 K/UL (ref 0–0.04)
IMM GRANULOCYTES NFR BLD AUTO: 0.3 % (ref 0–0.5)
LYMPHOCYTES # BLD AUTO: 1.2 K/UL (ref 1–4.8)
LYMPHOCYTES NFR BLD: 9.3 % (ref 18–48)
MCH RBC QN AUTO: 29.5 PG (ref 27–31)
MCHC RBC AUTO-ENTMCNC: 35.3 G/DL (ref 32–36)
MCV RBC AUTO: 84 FL (ref 82–98)
METHADONE UR QL SCN>300 NG/ML: NEGATIVE
MODE: ABNORMAL
MONOCYTES # BLD AUTO: 1.1 K/UL (ref 0.3–1)
MONOCYTES NFR BLD: 8.1 % (ref 4–15)
NEUTROPHILS # BLD AUTO: 10.8 K/UL (ref 1.8–7.7)
NEUTROPHILS NFR BLD: 82.1 % (ref 38–73)
NRBC BLD-RTO: 0 /100 WBC
OPIATES UR QL SCN: ABNORMAL
PCO2 BLDA: 52.9 MMHG (ref 35–45)
PCP UR QL SCN>25 NG/ML: NEGATIVE
PH SMN: 7.4 [PH] (ref 7.35–7.45)
PHOSPHATE SERPL-MCNC: 3.7 MG/DL (ref 2.7–4.5)
PLATELET # BLD AUTO: 206 K/UL (ref 150–450)
PMV BLD AUTO: 10.8 FL (ref 9.2–12.9)
PO2 BLDA: 26 MMHG (ref 40–60)
POC BE: 8 MMOL/L
POC SATURATED O2: 46 % (ref 95–100)
POCT GLUCOSE: 188 MG/DL (ref 70–110)
POCT GLUCOSE: 214 MG/DL (ref 70–110)
POCT GLUCOSE: 234 MG/DL (ref 70–110)
POCT GLUCOSE: 235 MG/DL (ref 70–110)
POCT GLUCOSE: 236 MG/DL (ref 70–110)
POCT GLUCOSE: 237 MG/DL (ref 70–110)
POCT GLUCOSE: 252 MG/DL (ref 70–110)
POCT GLUCOSE: 282 MG/DL (ref 70–110)
RBC # BLD AUTO: 5.39 M/UL (ref 4.6–6.2)
SAMPLE: ABNORMAL
SITE: ABNORMAL
TOXICOLOGY INFORMATION: ABNORMAL
WBC # BLD AUTO: 13.21 K/UL (ref 3.9–12.7)

## 2023-05-19 PROCEDURE — 25000003 PHARM REV CODE 250: Performed by: STUDENT IN AN ORGANIZED HEALTH CARE EDUCATION/TRAINING PROGRAM

## 2023-05-19 PROCEDURE — 99900035 HC TECH TIME PER 15 MIN (STAT)

## 2023-05-19 PROCEDURE — 63600175 PHARM REV CODE 636 W HCPCS: Performed by: EMERGENCY MEDICINE

## 2023-05-19 PROCEDURE — 84100 ASSAY OF PHOSPHORUS: CPT | Performed by: NURSE PRACTITIONER

## 2023-05-19 PROCEDURE — 25000003 PHARM REV CODE 250: Performed by: NURSE PRACTITIONER

## 2023-05-19 PROCEDURE — 93010 ELECTROCARDIOGRAM REPORT: CPT | Mod: ,,, | Performed by: STUDENT IN AN ORGANIZED HEALTH CARE EDUCATION/TRAINING PROGRAM

## 2023-05-19 PROCEDURE — 93010 EKG 12-LEAD: ICD-10-PCS | Mod: ,,, | Performed by: STUDENT IN AN ORGANIZED HEALTH CARE EDUCATION/TRAINING PROGRAM

## 2023-05-19 PROCEDURE — 11000001 HC ACUTE MED/SURG PRIVATE ROOM

## 2023-05-19 PROCEDURE — 82803 BLOOD GASES ANY COMBINATION: CPT

## 2023-05-19 PROCEDURE — G0378 HOSPITAL OBSERVATION PER HR: HCPCS

## 2023-05-19 PROCEDURE — 96372 THER/PROPH/DIAG INJ SC/IM: CPT | Performed by: NURSE PRACTITIONER

## 2023-05-19 PROCEDURE — 93005 ELECTROCARDIOGRAM TRACING: CPT

## 2023-05-19 PROCEDURE — 25000003 PHARM REV CODE 250: Performed by: EMERGENCY MEDICINE

## 2023-05-19 PROCEDURE — 25500020 PHARM REV CODE 255: Performed by: EMERGENCY MEDICINE

## 2023-05-19 PROCEDURE — 96375 TX/PRO/DX INJ NEW DRUG ADDON: CPT

## 2023-05-19 PROCEDURE — 83036 HEMOGLOBIN GLYCOSYLATED A1C: CPT | Performed by: NURSE PRACTITIONER

## 2023-05-19 PROCEDURE — 63600175 PHARM REV CODE 636 W HCPCS: Performed by: NURSE PRACTITIONER

## 2023-05-19 PROCEDURE — 85025 COMPLETE CBC W/AUTO DIFF WBC: CPT | Performed by: NURSE PRACTITIONER

## 2023-05-19 RX ORDER — ACETAMINOPHEN 325 MG/1
650 TABLET ORAL EVERY 4 HOURS PRN
Status: DISCONTINUED | OUTPATIENT
Start: 2023-05-19 | End: 2023-05-21 | Stop reason: HOSPADM

## 2023-05-19 RX ORDER — SODIUM CHLORIDE 9 MG/ML
INJECTION, SOLUTION INTRAVENOUS CONTINUOUS
Status: DISCONTINUED | OUTPATIENT
Start: 2023-05-19 | End: 2023-05-20

## 2023-05-19 RX ORDER — AMLODIPINE BESYLATE 5 MG/1
5 TABLET ORAL DAILY
Status: DISCONTINUED | OUTPATIENT
Start: 2023-05-19 | End: 2023-05-21 | Stop reason: HOSPADM

## 2023-05-19 RX ORDER — SIMETHICONE 80 MG
1 TABLET,CHEWABLE ORAL 4 TIMES DAILY PRN
Status: DISCONTINUED | OUTPATIENT
Start: 2023-05-19 | End: 2023-05-21 | Stop reason: HOSPADM

## 2023-05-19 RX ORDER — NALOXONE HCL 0.4 MG/ML
0.02 VIAL (ML) INJECTION
Status: DISCONTINUED | OUTPATIENT
Start: 2023-05-19 | End: 2023-05-21 | Stop reason: HOSPADM

## 2023-05-19 RX ORDER — SODIUM,POTASSIUM PHOSPHATES 280-250MG
2 POWDER IN PACKET (EA) ORAL
Status: DISCONTINUED | OUTPATIENT
Start: 2023-05-19 | End: 2023-05-21 | Stop reason: HOSPADM

## 2023-05-19 RX ORDER — PROCHLORPERAZINE EDISYLATE 5 MG/ML
5 INJECTION INTRAMUSCULAR; INTRAVENOUS EVERY 6 HOURS PRN
Status: DISCONTINUED | OUTPATIENT
Start: 2023-05-19 | End: 2023-05-21 | Stop reason: HOSPADM

## 2023-05-19 RX ORDER — TALC
6 POWDER (GRAM) TOPICAL NIGHTLY PRN
Status: DISCONTINUED | OUTPATIENT
Start: 2023-05-19 | End: 2023-05-21 | Stop reason: HOSPADM

## 2023-05-19 RX ORDER — LISINOPRIL 20 MG/1
20 TABLET ORAL DAILY
Status: DISCONTINUED | OUTPATIENT
Start: 2023-05-19 | End: 2023-05-19

## 2023-05-19 RX ORDER — MECLIZINE HYDROCHLORIDE 25 MG/1
50 TABLET ORAL
Status: COMPLETED | OUTPATIENT
Start: 2023-05-19 | End: 2023-05-19

## 2023-05-19 RX ORDER — GLUCAGON 1 MG
1 KIT INJECTION
Status: DISCONTINUED | OUTPATIENT
Start: 2023-05-19 | End: 2023-05-21 | Stop reason: HOSPADM

## 2023-05-19 RX ORDER — IBUPROFEN 200 MG
16 TABLET ORAL
Status: DISCONTINUED | OUTPATIENT
Start: 2023-05-19 | End: 2023-05-21 | Stop reason: HOSPADM

## 2023-05-19 RX ORDER — PROCHLORPERAZINE EDISYLATE 5 MG/ML
5 INJECTION INTRAMUSCULAR; INTRAVENOUS EVERY 6 HOURS PRN
Status: DISCONTINUED | OUTPATIENT
Start: 2023-05-19 | End: 2023-05-19

## 2023-05-19 RX ORDER — POLYETHYLENE GLYCOL 3350 17 G/17G
17 POWDER, FOR SOLUTION ORAL 2 TIMES DAILY PRN
Status: DISCONTINUED | OUTPATIENT
Start: 2023-05-19 | End: 2023-05-21 | Stop reason: HOSPADM

## 2023-05-19 RX ORDER — LANOLIN ALCOHOL/MO/W.PET/CERES
800 CREAM (GRAM) TOPICAL
Status: DISCONTINUED | OUTPATIENT
Start: 2023-05-19 | End: 2023-05-21 | Stop reason: HOSPADM

## 2023-05-19 RX ORDER — IBUPROFEN 200 MG
24 TABLET ORAL
Status: DISCONTINUED | OUTPATIENT
Start: 2023-05-19 | End: 2023-05-21 | Stop reason: HOSPADM

## 2023-05-19 RX ORDER — ONDANSETRON 2 MG/ML
4 INJECTION INTRAMUSCULAR; INTRAVENOUS EVERY 6 HOURS PRN
Status: DISCONTINUED | OUTPATIENT
Start: 2023-05-19 | End: 2023-05-19

## 2023-05-19 RX ORDER — LISINOPRIL 20 MG/1
20 TABLET ORAL DAILY
Status: DISCONTINUED | OUTPATIENT
Start: 2023-05-19 | End: 2023-05-20

## 2023-05-19 RX ORDER — AMOXICILLIN 250 MG
1 CAPSULE ORAL 2 TIMES DAILY
Status: DISCONTINUED | OUTPATIENT
Start: 2023-05-19 | End: 2023-05-21 | Stop reason: HOSPADM

## 2023-05-19 RX ORDER — HYDRALAZINE HYDROCHLORIDE 20 MG/ML
10 INJECTION INTRAMUSCULAR; INTRAVENOUS EVERY 4 HOURS PRN
Status: DISCONTINUED | OUTPATIENT
Start: 2023-05-19 | End: 2023-05-21 | Stop reason: HOSPADM

## 2023-05-19 RX ORDER — MAG HYDROX/ALUMINUM HYD/SIMETH 200-200-20
30 SUSPENSION, ORAL (FINAL DOSE FORM) ORAL 4 TIMES DAILY PRN
Status: DISCONTINUED | OUTPATIENT
Start: 2023-05-19 | End: 2023-05-21 | Stop reason: HOSPADM

## 2023-05-19 RX ORDER — FAMOTIDINE 20 MG/1
20 TABLET, FILM COATED ORAL 2 TIMES DAILY
Status: DISCONTINUED | OUTPATIENT
Start: 2023-05-19 | End: 2023-05-20

## 2023-05-19 RX ORDER — SODIUM CHLORIDE 0.9 % (FLUSH) 0.9 %
10 SYRINGE (ML) INJECTION EVERY 8 HOURS PRN
Status: DISCONTINUED | OUTPATIENT
Start: 2023-05-19 | End: 2023-05-21 | Stop reason: HOSPADM

## 2023-05-19 RX ORDER — ONDANSETRON 2 MG/ML
4 INJECTION INTRAMUSCULAR; INTRAVENOUS EVERY 6 HOURS
Status: DISCONTINUED | OUTPATIENT
Start: 2023-05-19 | End: 2023-05-21 | Stop reason: HOSPADM

## 2023-05-19 RX ORDER — ENOXAPARIN SODIUM 100 MG/ML
40 INJECTION SUBCUTANEOUS EVERY 24 HOURS
Status: DISCONTINUED | OUTPATIENT
Start: 2023-05-19 | End: 2023-05-21 | Stop reason: HOSPADM

## 2023-05-19 RX ORDER — HYDROCODONE BITARTRATE AND ACETAMINOPHEN 5; 325 MG/1; MG/1
1 TABLET ORAL EVERY 6 HOURS PRN
Status: DISCONTINUED | OUTPATIENT
Start: 2023-05-19 | End: 2023-05-21 | Stop reason: HOSPADM

## 2023-05-19 RX ORDER — INSULIN ASPART 100 [IU]/ML
0-5 INJECTION, SOLUTION INTRAVENOUS; SUBCUTANEOUS
Status: DISCONTINUED | OUTPATIENT
Start: 2023-05-19 | End: 2023-05-21 | Stop reason: HOSPADM

## 2023-05-19 RX ORDER — HYDRALAZINE HYDROCHLORIDE 20 MG/ML
10 INJECTION INTRAMUSCULAR; INTRAVENOUS
Status: COMPLETED | OUTPATIENT
Start: 2023-05-19 | End: 2023-05-19

## 2023-05-19 RX ORDER — AMLODIPINE BESYLATE 5 MG/1
5 TABLET ORAL DAILY
Status: DISCONTINUED | OUTPATIENT
Start: 2023-05-19 | End: 2023-05-19

## 2023-05-19 RX ORDER — IPRATROPIUM BROMIDE AND ALBUTEROL SULFATE 2.5; .5 MG/3ML; MG/3ML
3 SOLUTION RESPIRATORY (INHALATION) EVERY 6 HOURS PRN
Status: DISCONTINUED | OUTPATIENT
Start: 2023-05-19 | End: 2023-05-21 | Stop reason: HOSPADM

## 2023-05-19 RX ADMIN — SODIUM CHLORIDE: 9 INJECTION, SOLUTION INTRAVENOUS at 04:05

## 2023-05-19 RX ADMIN — INSULIN ASPART 3 UNITS: 100 INJECTION, SOLUTION INTRAVENOUS; SUBCUTANEOUS at 03:05

## 2023-05-19 RX ADMIN — LISINOPRIL 20 MG: 20 TABLET ORAL at 04:05

## 2023-05-19 RX ADMIN — PROCHLORPERAZINE EDISYLATE 5 MG: 5 INJECTION INTRAMUSCULAR; INTRAVENOUS at 05:05

## 2023-05-19 RX ADMIN — ONDANSETRON 4 MG: 2 INJECTION INTRAMUSCULAR; INTRAVENOUS at 03:05

## 2023-05-19 RX ADMIN — HYDRALAZINE HYDROCHLORIDE 10 MG: 20 INJECTION, SOLUTION INTRAMUSCULAR; INTRAVENOUS at 04:05

## 2023-05-19 RX ADMIN — ENOXAPARIN SODIUM 40 MG: 40 INJECTION SUBCUTANEOUS at 05:05

## 2023-05-19 RX ADMIN — SODIUM CHLORIDE: 9 INJECTION, SOLUTION INTRAVENOUS at 09:05

## 2023-05-19 RX ADMIN — INSULIN ASPART 2 UNITS: 100 INJECTION, SOLUTION INTRAVENOUS; SUBCUTANEOUS at 05:05

## 2023-05-19 RX ADMIN — MECLIZINE HYDROCHLORIDE 50 MG: 25 TABLET ORAL at 02:05

## 2023-05-19 RX ADMIN — ONDANSETRON 4 MG: 2 INJECTION INTRAMUSCULAR; INTRAVENOUS at 11:05

## 2023-05-19 RX ADMIN — ONDANSETRON 4 MG: 2 INJECTION INTRAMUSCULAR; INTRAVENOUS at 05:05

## 2023-05-19 RX ADMIN — AMLODIPINE BESYLATE 5 MG: 5 TABLET ORAL at 04:05

## 2023-05-19 RX ADMIN — INSULIN DETEMIR 10 UNITS: 100 INJECTION, SOLUTION SUBCUTANEOUS at 03:05

## 2023-05-19 RX ADMIN — FAMOTIDINE 20 MG: 20 TABLET, FILM COATED ORAL at 09:05

## 2023-05-19 RX ADMIN — SENNOSIDES AND DOCUSATE SODIUM 1 TABLET: 50; 8.6 TABLET ORAL at 08:05

## 2023-05-19 RX ADMIN — ONDANSETRON 4 MG: 2 INJECTION INTRAMUSCULAR; INTRAVENOUS at 12:05

## 2023-05-19 RX ADMIN — HYDRALAZINE HYDROCHLORIDE 10 MG: 20 INJECTION, SOLUTION INTRAMUSCULAR; INTRAVENOUS at 12:05

## 2023-05-19 RX ADMIN — SODIUM CHLORIDE: 9 INJECTION, SOLUTION INTRAVENOUS at 01:05

## 2023-05-19 RX ADMIN — SODIUM CHLORIDE, POTASSIUM CHLORIDE, SODIUM LACTATE AND CALCIUM CHLORIDE 1000 ML: 600; 310; 30; 20 INJECTION, SOLUTION INTRAVENOUS at 02:05

## 2023-05-19 RX ADMIN — HYDRALAZINE HYDROCHLORIDE 10 MG: 20 INJECTION, SOLUTION INTRAMUSCULAR; INTRAVENOUS at 02:05

## 2023-05-19 RX ADMIN — SENNOSIDES AND DOCUSATE SODIUM 1 TABLET: 50; 8.6 TABLET ORAL at 09:05

## 2023-05-19 RX ADMIN — PROCHLORPERAZINE EDISYLATE 5 MG: 5 INJECTION INTRAMUSCULAR; INTRAVENOUS at 03:05

## 2023-05-19 NOTE — H&P
[facility]  [facility] Medicine  History & Physical    Patient Name: Russell Orellana  MRN: [id number]  Patient Class: OP- Observation  Admission Date: 5/18/2023  Attending Physician: Dr. Pacheco  Primary Care Provider: Primary Doctor No         Patient information was obtained from patient and ER records.     Subjective:     Principal Problem:Intractable nausea and vomiting    Chief Complaint:   Chief Complaint   Patient presents with    Emesis     Pt states for the past 24 hours he has been dealing with emesis that does not improve with OTC medications. Pt states emesis is clear. Pt also states his left lower abdominal area is hurting.         HPI: The patient is a 56 yo  -speaking male with IDDM, HTN, HLD, noncompliant with medications who presented to ED with N/V and abdominal pain -onset 3 days ago that gradually worsened. Language Line used. Pt reports abdominal pain described as upper abdominal cramping on/off rated up to 10/10 pain associated with N/V. Pt reports he has been out of all his medications for over one month.     In the ED, /119, Mild tachycardia, WBC normal, Afebrile. Na 132, +anion gap acidosis, Glucose 313. UA +3 ketones. Venous ABG showed normal PH. CT abdomen showed nothing acute.     The patient was given IV Hydralazine, IV Morphine, IV Zofran, and IV Phenergan. BP and symptoms improved.     The patient will be placed in observation under hospital medicine       Past Medical History:   Diagnosis Date    Diabetes mellitus     High cholesterol     Hypertension        Past Surgical History:   Procedure Laterality Date    NO PAST SURGERIES         Review of patient's allergies indicates:  No Known Allergies    No current facility-administered medications on file prior to encounter.     Current Outpatient Medications on File Prior to Encounter   Medication Sig    amLODIPine (NORVASC) 5 MG tablet Take 1 tablet (5 mg total) by mouth once daily.    famotidine (PEPCID) 20  MG tablet Take 1 tablet (20 mg total) by mouth 2 (two) times daily.    insulin aspart protamine-insulin aspart (NOVOLOG 70/30) 100 unit/mL (70-30) InPn pen Inject 5 Units into the skin 2 (two) times daily before meals.    lisinopriL (PRINIVIL,ZESTRIL) 20 MG tablet Take 1 tablet (20 mg total) by mouth once daily.    metFORMIN (GLUCOPHAGE) 1000 MG tablet Take 1 tablet (1,000 mg total) by mouth 2 (two) times daily with meals.    ondansetron (ZOFRAN-ODT) 4 MG TbDL Take 1 tablet (4 mg total) by mouth every 6 (six) hours as needed (nausea).     Family History       Problem Relation (Age of Onset)    Diabetes Father    Hypertension Father    No Known Problems Mother          Tobacco Use    Smoking status: Never    Smokeless tobacco: Never   Substance and Sexual Activity    Alcohol use: Not Currently    Drug use: Never    Sexual activity: Not on file     Review of Systems   Constitutional:  Positive for activity change and appetite change. Negative for chills, diaphoresis, fatigue and fever.   HENT:  Negative for congestion, nosebleeds, sore throat and trouble swallowing.    Eyes:  Negative for pain, discharge and visual disturbance.   Respiratory:  Negative for apnea, cough, chest tightness, shortness of breath, wheezing and stridor.    Cardiovascular:  Negative for chest pain, palpitations and leg swelling.   Gastrointestinal:  Positive for abdominal pain, nausea and vomiting. Negative for abdominal distention, blood in stool, constipation and diarrhea.   Endocrine: Negative for cold intolerance and heat intolerance.   Genitourinary:  Negative for difficulty urinating, dysuria, flank pain, frequency and urgency.   Musculoskeletal:  Negative for arthralgias, back pain, joint swelling, myalgias, neck pain and neck stiffness.   Skin:  Negative for rash and wound.   Allergic/Immunologic: Negative for food allergies and immunocompromised state.   Neurological:  Negative for dizziness, seizures, syncope, facial  asymmetry, weakness, light-headedness and headaches.   Hematological:  Negative for adenopathy.   Psychiatric/Behavioral:  Negative for agitation, behavioral problems and confusion. The patient is not nervous/anxious.    Objective:     Vital Signs (Most Recent):  Temp: 98 °F (36.7 °C) (05/18/23 2357)  Pulse: 107 (05/19/23 0302)  Resp: 16 (05/19/23 0132)  BP: (!) 180/85 (05/19/23 0302)  SpO2: 97 % (05/19/23 0302) Vital Signs (24h Range):  Temp:  [98 °F (36.7 °C)] 98 °F (36.7 °C)  Pulse:  [] 107  Resp:  [16-18] 16  SpO2:  [96 %-99 %] 97 %  BP: (112-248)/() 180/85     Weight: 68 kg (150 lb)  Body mass index is 24.96 kg/m².     Physical Exam  Vitals and nursing note reviewed.   Constitutional:       Appearance: He is well-developed.   HENT:      Head: Normocephalic and atraumatic.      Nose: Nose normal.   Eyes:      General: No scleral icterus.  Cardiovascular:      Rate and Rhythm: Normal rate and regular rhythm.      Heart sounds: Normal heart sounds. No murmur heard.    No friction rub. No gallop.   Pulmonary:      Effort: Pulmonary effort is normal. No respiratory distress.      Breath sounds: Normal breath sounds. No wheezing.   Abdominal:      General: Bowel sounds are normal. There is no distension.      Palpations: Abdomen is soft.      Tenderness: There is no abdominal tenderness.   Musculoskeletal:         General: Normal range of motion.      Cervical back: Normal range of motion and neck supple.   Skin:     General: Skin is warm and dry.      Findings: No rash.   Neurological:      Mental Status: He is alert and oriented to person, place, and time.      Cranial Nerves: No cranial nerve deficit.   Psychiatric:         Behavior: Behavior normal.              Significant Labs: All pertinent labs within the past 24 hours have been reviewed.    Significant Imaging: I have reviewed all pertinent imaging results/findings within the past 24 hours.    Assessment/Plan:     * Intractable nausea and  vomiting  Likely 2/2 diabetic gastroparesis from noncompliance with insulin   Scheduled IV Zofran   IV Compazine and phenergan prn   IVfs        Increased anion gap metabolic acidosis  Likely 2/2 hyperglycemia and dehydration from vomiting   Cont IVfs  Monitor labs and electrolytes       Hypertensive urgency  Patient has a current diagnosis of hypertensive urgency (without evidence of end organ damage) which is uncontrolled.  Latest blood pressure and vitals reviewed-   Temp:  [98 °F (36.7 °C)]   Pulse:  []   Resp:  [16-18]   BP: (112-248)/()   SpO2:  [96 %-99 %] .   Patient currently off IV antihypertensives.   Home meds for hypertension were reviewed and noted below.   Hypertension Medications             amLODIPine (NORVASC) 5 MG tablet Take 1 tablet (5 mg total) by mouth once daily.    lisinopriL (PRINIVIL,ZESTRIL) 20 MG tablet Take 1 tablet (20 mg total) by mouth once daily.          Medication adjustment for hospital antihypertensives is as follows- restart home meds Amlodipine and Lisinopril   IV Hydralazine prn     Will aim for controlled BP reduction by medications noted above. Monitor and mitigate end organ damage as indicated.    Abdominal pain  Likely 2/2 diabetic gastroparesis   Improved       Type 2 diabetes mellitus with hyperglycemia, with long-term current use of insulin  Patient's FSGs are uncontrolled due to hyperglycemia on current medication regimen.  Last A1c reviewed-   Lab Results   Component Value Date    HGBA1C 10.5 (H) 11/28/2022     Most recent fingerstick glucose reviewed-   Recent Labs   Lab 05/19/23  0042 05/19/23  0332   POCTGLUCOSE 236* 234*     Current correctional scale  Low  Maintain anti-hyperglycemic dose as follows-   Antihyperglycemics (From admission, onward)    Start     Stop Route Frequency Ordered    05/19/23 0416  insulin aspart U-100 pen 0-5 Units         -- SubQ Before meals & nightly PRN 05/19/23 0317    05/19/23 0315  insulin detemir U-100 (Levemir) pen 10  Units         -- SubQ Daily 05/19/23 0313        Hold Oral hypoglycemics while patient is in the hospital.    Levemir 10 untis SQ daily -titrate prn       VTE Risk Mitigation (From admission, onward)         Ordered     enoxaparin injection 40 mg  Daily         05/19/23 0317     IP VTE LOW RISK PATIENT  Once         05/19/23 0317     Place sequential compression device  Until discontinued         05/19/23 0317                     On 05/19/2023, patient should be placed in hospital observation services under my care in collaboration with Dr. Pacheco.      Chantal Quiroga NP  Department of Hospital Medicine  '[place] - Emergency Dept.

## 2023-05-19 NOTE — ASSESSMENT & PLAN NOTE
Likely 2/2 hyperglycemia and dehydration from vomiting   Cont IVfs  Monitor labs and electrolytes

## 2023-05-19 NOTE — ASSESSMENT & PLAN NOTE
Patient's FSGs are uncontrolled due to hyperglycemia on current medication regimen.  Last A1c reviewed-   Lab Results   Component Value Date    HGBA1C 10.5 (H) 11/28/2022     Most recent fingerstick glucose reviewed-   Recent Labs   Lab 05/19/23  0042 05/19/23  0332   POCTGLUCOSE 236* 234*     Current correctional scale  Low  Maintain anti-hyperglycemic dose as follows-   Antihyperglycemics (From admission, onward)    Start     Stop Route Frequency Ordered    05/19/23 0416  insulin aspart U-100 pen 0-5 Units         -- SubQ Before meals & nightly PRN 05/19/23 0317    05/19/23 0315  insulin detemir U-100 (Levemir) pen 10 Units         -- SubQ Daily 05/19/23 0313        Hold Oral hypoglycemics while patient is in the hospital.    Levemir 10 untis SQ daily -titrate prn

## 2023-05-19 NOTE — ASSESSMENT & PLAN NOTE
Patient has a current diagnosis of hypertensive urgency (without evidence of end organ damage) which is uncontrolled.  Latest blood pressure and vitals reviewed-   Temp:  [98 °F (36.7 °C)]   Pulse:  []   Resp:  [16-18]   BP: (112-248)/()   SpO2:  [96 %-99 %] .   Patient currently off IV antihypertensives.   Home meds for hypertension were reviewed and noted below.   Hypertension Medications             amLODIPine (NORVASC) 5 MG tablet Take 1 tablet (5 mg total) by mouth once daily.    lisinopriL (PRINIVIL,ZESTRIL) 20 MG tablet Take 1 tablet (20 mg total) by mouth once daily.          Medication adjustment for hospital antihypertensives is as follows- restart home meds Amlodipine and Lisinopril   IV Hydralazine prn     Will aim for controlled BP reduction by medications noted above. Monitor and mitigate end organ damage as indicated.

## 2023-05-19 NOTE — PLAN OF CARE
Patient is awake, alert and oriented x 4. Vitally the patient is stable and denies any concerns regarding treatment. Will continue to monitor the patient and administer scheduled medications.  Mongolian speaking. Reporting nausea.

## 2023-05-19 NOTE — CONSULTS
Diabetes Education  Author: Rajani Asif, RN  Date: 5/19/2023      Consulted for diabetes education. Patient speaks [employer] and  Mima # [id number] and Jessica # [id number] used. Patient states he has been a diabetic for about 10 years and he was placed on insulin around 4 months ago at the hospital. He received an insulin pen upon discharge and once he used that insulin he never went and got a refill. When asked for barriers he said he just was busy. Patient states he doesn't eat a diabetic diet and doesn't check his blood sugar levels. Educated patient on the importance of managing blood sugar levels. Sample glucometer given to patient and patient states he has used before and knows how to use. Also spoke with patient about diabetic diet food options and educational handouts given. Patient has refills at [facility]. I spoke with pharmacy so patient can get refill prior to discharge. Patient has 1 refill and will delivery to bed. I also informed patient of an affordable option. [employer] sells 70/30 insulin pen over the counter. Patient given information. Recommend for patient to start taking insulin as ordered, eat a diabetic diet and check blood sugar levels at least daily. Patient states he is willing to start taking care of his diabetes and managing his blood sugar levels.                   Health Maintenance was reviewed today with patient. Discussed with patient importance of routine eye exams, foot exams/foot care, blood work (i.e.: A1c, microalbumin, and lipid), dental visits, yearly flu vaccine, and pneumonia vaccine as indicated by PCP. Patient verbalized understanding.     Health Maintenance Topics with due status: Not Due       Topic Last Completion Date    Influenza Vaccine Not Due     Health Maintenance Due   Topic Date Due    Lipid Panel  Never done    COVID-19 Vaccine (1) Never done    Diabetes Urine Screening  Never done    Pneumococcal Vaccines (Age 0-64) (1 - PCV) Never done    Foot  Exam  Never done    Eye Exam  Never done    TETANUS VACCINE  Never done    Low Dose Statin  Never done    Colorectal Cancer Screening  Never done    Shingles Vaccine (1 of 2) Never done    Hemoglobin A1c  02/28/2023                                                                                                    Today's Self-Management Care Plan was developed with the patient's input and is based on barriers identified during today's assessment.    The long and short-term goals in the care plan were written with the patient/caregiver's input. The patient has agreed to work toward these goals to improve his overall diabetes control.          The patient was encouraged to communicate with his physician and care team regarding his condition(s) and treatment.  I provided the patient with my contact information today and encouraged him to contact me via phone or patient portal as needed.

## 2023-05-19 NOTE — ASSESSMENT & PLAN NOTE
Likely 2/2 diabetic gastroparesis from noncompliance with insulin   Scheduled IV Zofran   IV Compazine and phenergan prn   IVfs

## 2023-05-19 NOTE — HPI
The patient is a 58 yo  -speaking male with IDDM, HTN, HLD, noncompliant with medications who presented to ED with N/V and abdominal pain -onset 3 days ago that gradually worsened. Language Line used. Pt reports abdominal pain described as upper abdominal cramping on/off rated up to 10/10 pain associated with N/V. Pt reports he has been out of all his medications for over one month.     In the ED, /119, Mild tachycardia, WBC normal, Afebrile. Na 132, +anion gap acidosis, Glucose 313. UA +3 ketones. Venous ABG showed normal PH. CT abdomen showed nothing acute.     The patient was given IV Hydralazine, IV Morphine, IV Zofran, and IV Phenergan. BP and symptoms improved.     The patient will be placed in observation under hospital medicine

## 2023-05-19 NOTE — SUBJECTIVE & OBJECTIVE
Past Medical History:   Diagnosis Date    Diabetes mellitus     High cholesterol     Hypertension        Past Surgical History:   Procedure Laterality Date    NO PAST SURGERIES         Review of patient's allergies indicates:  No Known Allergies    No current facility-administered medications on file prior to encounter.     Current Outpatient Medications on File Prior to Encounter   Medication Sig    amLODIPine (NORVASC) 5 MG tablet Take 1 tablet (5 mg total) by mouth once daily.    famotidine (PEPCID) 20 MG tablet Take 1 tablet (20 mg total) by mouth 2 (two) times daily.    insulin aspart protamine-insulin aspart (NOVOLOG 70/30) 100 unit/mL (70-30) InPn pen Inject 5 Units into the skin 2 (two) times daily before meals.    lisinopriL (PRINIVIL,ZESTRIL) 20 MG tablet Take 1 tablet (20 mg total) by mouth once daily.    metFORMIN (GLUCOPHAGE) 1000 MG tablet Take 1 tablet (1,000 mg total) by mouth 2 (two) times daily with meals.    ondansetron (ZOFRAN-ODT) 4 MG TbDL Take 1 tablet (4 mg total) by mouth every 6 (six) hours as needed (nausea).     Family History       Problem Relation (Age of Onset)    Diabetes Father    Hypertension Father    No Known Problems Mother          Tobacco Use    Smoking status: Never    Smokeless tobacco: Never   Substance and Sexual Activity    Alcohol use: Not Currently    Drug use: Never    Sexual activity: Not on file     Review of Systems   Constitutional:  Positive for activity change and appetite change. Negative for chills, diaphoresis, fatigue and fever.   HENT:  Negative for congestion, nosebleeds, sore throat and trouble swallowing.    Eyes:  Negative for pain, discharge and visual disturbance.   Respiratory:  Negative for apnea, cough, chest tightness, shortness of breath, wheezing and stridor.    Cardiovascular:  Negative for chest pain, palpitations and leg swelling.   Gastrointestinal:  Positive for abdominal pain, nausea and vomiting. Negative for abdominal distention, blood in  stool, constipation and diarrhea.   Endocrine: Negative for cold intolerance and heat intolerance.   Genitourinary:  Negative for difficulty urinating, dysuria, flank pain, frequency and urgency.   Musculoskeletal:  Negative for arthralgias, back pain, joint swelling, myalgias, neck pain and neck stiffness.   Skin:  Negative for rash and wound.   Allergic/Immunologic: Negative for food allergies and immunocompromised state.   Neurological:  Negative for dizziness, seizures, syncope, facial asymmetry, weakness, light-headedness and headaches.   Hematological:  Negative for adenopathy.   Psychiatric/Behavioral:  Negative for agitation, behavioral problems and confusion. The patient is not nervous/anxious.    Objective:     Vital Signs (Most Recent):  Temp: 98 °F (36.7 °C) (05/18/23 2357)  Pulse: 107 (05/19/23 0302)  Resp: 16 (05/19/23 0132)  BP: (!) 180/85 (05/19/23 0302)  SpO2: 97 % (05/19/23 0302) Vital Signs (24h Range):  Temp:  [98 °F (36.7 °C)] 98 °F (36.7 °C)  Pulse:  [] 107  Resp:  [16-18] 16  SpO2:  [96 %-99 %] 97 %  BP: (112-248)/() 180/85     Weight: 68 kg (150 lb)  Body mass index is 24.96 kg/m².     Physical Exam  Vitals and nursing note reviewed.   Constitutional:       Appearance: He is well-developed.   HENT:      Head: Normocephalic and atraumatic.      Nose: Nose normal.   Eyes:      General: No scleral icterus.  Cardiovascular:      Rate and Rhythm: Normal rate and regular rhythm.      Heart sounds: Normal heart sounds. No murmur heard.    No friction rub. No gallop.   Pulmonary:      Effort: Pulmonary effort is normal. No respiratory distress.      Breath sounds: Normal breath sounds. No wheezing.   Abdominal:      General: Bowel sounds are normal. There is no distension.      Palpations: Abdomen is soft.      Tenderness: There is no abdominal tenderness.   Musculoskeletal:         General: Normal range of motion.      Cervical back: Normal range of motion and neck supple.   Skin:      General: Skin is warm and dry.      Findings: No rash.   Neurological:      Mental Status: He is alert and oriented to person, place, and time.      Cranial Nerves: No cranial nerve deficit.   Psychiatric:         Behavior: Behavior normal.              Significant Labs: All pertinent labs within the past 24 hours have been reviewed.    Significant Imaging: I have reviewed all pertinent imaging results/findings within the past 24 hours.

## 2023-05-20 LAB
ALBUMIN SERPL BCP-MCNC: 3 G/DL (ref 3.5–5.2)
ALBUMIN SERPL BCP-MCNC: 3.8 G/DL (ref 3.5–5.2)
ALP SERPL-CCNC: 43 U/L (ref 55–135)
ALP SERPL-CCNC: 58 U/L (ref 55–135)
ALT SERPL W/O P-5'-P-CCNC: 14 U/L (ref 10–44)
ALT SERPL W/O P-5'-P-CCNC: 16 U/L (ref 10–44)
ANION GAP SERPL CALC-SCNC: 13 MMOL/L (ref 8–16)
ANION GAP SERPL CALC-SCNC: 7 MMOL/L (ref 8–16)
AST SERPL-CCNC: 16 U/L (ref 10–40)
AST SERPL-CCNC: 24 U/L (ref 10–40)
BASOPHILS # BLD AUTO: 0.06 K/UL (ref 0–0.2)
BASOPHILS # BLD AUTO: 0.06 K/UL (ref 0–0.2)
BASOPHILS NFR BLD: 0.6 % (ref 0–1.9)
BASOPHILS NFR BLD: 0.8 % (ref 0–1.9)
BILIRUB SERPL-MCNC: 1.1 MG/DL (ref 0.1–1)
BILIRUB SERPL-MCNC: 1.2 MG/DL (ref 0.1–1)
BUN SERPL-MCNC: 14 MG/DL (ref 6–20)
BUN SERPL-MCNC: 19 MG/DL (ref 6–20)
CALCIUM SERPL-MCNC: 8.2 MG/DL (ref 8.7–10.5)
CALCIUM SERPL-MCNC: 8.3 MG/DL (ref 8.7–10.5)
CHLORIDE SERPL-SCNC: 102 MMOL/L (ref 95–110)
CHLORIDE SERPL-SCNC: 110 MMOL/L (ref 95–110)
CO2 SERPL-SCNC: 18 MMOL/L (ref 23–29)
CO2 SERPL-SCNC: 22 MMOL/L (ref 23–29)
CREAT SERPL-MCNC: 0.9 MG/DL (ref 0.5–1.4)
CREAT SERPL-MCNC: 1.1 MG/DL (ref 0.5–1.4)
DIFFERENTIAL METHOD: ABNORMAL
DIFFERENTIAL METHOD: ABNORMAL
EOSINOPHIL # BLD AUTO: 0 K/UL (ref 0–0.5)
EOSINOPHIL # BLD AUTO: 0.1 K/UL (ref 0–0.5)
EOSINOPHIL NFR BLD: 0.1 % (ref 0–8)
EOSINOPHIL NFR BLD: 1.5 % (ref 0–8)
ERYTHROCYTE [DISTWIDTH] IN BLOOD BY AUTOMATED COUNT: 11.8 % (ref 11.5–14.5)
ERYTHROCYTE [DISTWIDTH] IN BLOOD BY AUTOMATED COUNT: 12.3 % (ref 11.5–14.5)
EST. GFR  (NO RACE VARIABLE): >60 ML/MIN/1.73 M^2
EST. GFR  (NO RACE VARIABLE): >60 ML/MIN/1.73 M^2
GLUCOSE SERPL-MCNC: 147 MG/DL (ref 70–110)
GLUCOSE SERPL-MCNC: 239 MG/DL (ref 70–110)
HCT VFR BLD AUTO: 39.8 % (ref 40–54)
HCT VFR BLD AUTO: 43.3 % (ref 40–54)
HGB BLD-MCNC: 13.1 G/DL (ref 14–18)
HGB BLD-MCNC: 15.1 G/DL (ref 14–18)
IMM GRANULOCYTES # BLD AUTO: 0.03 K/UL (ref 0–0.04)
IMM GRANULOCYTES # BLD AUTO: 0.14 K/UL (ref 0–0.04)
IMM GRANULOCYTES NFR BLD AUTO: 0.3 % (ref 0–0.5)
IMM GRANULOCYTES NFR BLD AUTO: 1.8 % (ref 0–0.5)
LACTATE SERPL-SCNC: 1.8 MMOL/L (ref 0.5–2.2)
LIPASE SERPL-CCNC: 36 U/L (ref 4–60)
LYMPHOCYTES # BLD AUTO: 1.4 K/UL (ref 1–4.8)
LYMPHOCYTES # BLD AUTO: 2 K/UL (ref 1–4.8)
LYMPHOCYTES NFR BLD: 14.4 % (ref 18–48)
LYMPHOCYTES NFR BLD: 25.8 % (ref 18–48)
MAGNESIUM SERPL-MCNC: 2.3 MG/DL (ref 1.6–2.6)
MCH RBC QN AUTO: 29 PG (ref 27–31)
MCH RBC QN AUTO: 29.4 PG (ref 27–31)
MCHC RBC AUTO-ENTMCNC: 32.9 G/DL (ref 32–36)
MCHC RBC AUTO-ENTMCNC: 34.9 G/DL (ref 32–36)
MCV RBC AUTO: 84 FL (ref 82–98)
MCV RBC AUTO: 88 FL (ref 82–98)
MONOCYTES # BLD AUTO: 0.6 K/UL (ref 0.3–1)
MONOCYTES # BLD AUTO: 1 K/UL (ref 0.3–1)
MONOCYTES NFR BLD: 12.4 % (ref 4–15)
MONOCYTES NFR BLD: 6.3 % (ref 4–15)
NEUTROPHILS # BLD AUTO: 4.6 K/UL (ref 1.8–7.7)
NEUTROPHILS # BLD AUTO: 7.5 K/UL (ref 1.8–7.7)
NEUTROPHILS NFR BLD: 57.7 % (ref 38–73)
NEUTROPHILS NFR BLD: 78.3 % (ref 38–73)
NRBC BLD-RTO: 0 /100 WBC
NRBC BLD-RTO: 0 /100 WBC
PLATELET # BLD AUTO: 174 K/UL (ref 150–450)
PLATELET # BLD AUTO: 198 K/UL (ref 150–450)
PMV BLD AUTO: 10.7 FL (ref 9.2–12.9)
PMV BLD AUTO: 10.8 FL (ref 9.2–12.9)
POCT GLUCOSE: 137 MG/DL (ref 70–110)
POCT GLUCOSE: 222 MG/DL (ref 70–110)
POCT GLUCOSE: 253 MG/DL (ref 70–110)
POTASSIUM SERPL-SCNC: 3.3 MMOL/L (ref 3.5–5.1)
POTASSIUM SERPL-SCNC: 3.9 MMOL/L (ref 3.5–5.1)
PROT SERPL-MCNC: 5.4 G/DL (ref 6–8.4)
PROT SERPL-MCNC: 6.8 G/DL (ref 6–8.4)
RBC # BLD AUTO: 4.51 M/UL (ref 4.6–6.2)
RBC # BLD AUTO: 5.14 M/UL (ref 4.6–6.2)
SODIUM SERPL-SCNC: 133 MMOL/L (ref 136–145)
SODIUM SERPL-SCNC: 139 MMOL/L (ref 136–145)
WBC # BLD AUTO: 7.9 K/UL (ref 3.9–12.7)
WBC # BLD AUTO: 9.53 K/UL (ref 3.9–12.7)

## 2023-05-20 PROCEDURE — 25000003 PHARM REV CODE 250: Performed by: STUDENT IN AN ORGANIZED HEALTH CARE EDUCATION/TRAINING PROGRAM

## 2023-05-20 PROCEDURE — 36415 COLL VENOUS BLD VENIPUNCTURE: CPT | Performed by: NURSE PRACTITIONER

## 2023-05-20 PROCEDURE — 87040 BLOOD CULTURE FOR BACTERIA: CPT | Mod: 59 | Performed by: STUDENT IN AN ORGANIZED HEALTH CARE EDUCATION/TRAINING PROGRAM

## 2023-05-20 PROCEDURE — 25000003 PHARM REV CODE 250: Performed by: NURSE PRACTITIONER

## 2023-05-20 PROCEDURE — 85025 COMPLETE CBC W/AUTO DIFF WBC: CPT | Mod: 91 | Performed by: STUDENT IN AN ORGANIZED HEALTH CARE EDUCATION/TRAINING PROGRAM

## 2023-05-20 PROCEDURE — 80053 COMPREHEN METABOLIC PANEL: CPT | Mod: 91 | Performed by: STUDENT IN AN ORGANIZED HEALTH CARE EDUCATION/TRAINING PROGRAM

## 2023-05-20 PROCEDURE — 11000001 HC ACUTE MED/SURG PRIVATE ROOM

## 2023-05-20 PROCEDURE — 63600175 PHARM REV CODE 636 W HCPCS: Performed by: STUDENT IN AN ORGANIZED HEALTH CARE EDUCATION/TRAINING PROGRAM

## 2023-05-20 PROCEDURE — 83605 ASSAY OF LACTIC ACID: CPT | Performed by: STUDENT IN AN ORGANIZED HEALTH CARE EDUCATION/TRAINING PROGRAM

## 2023-05-20 PROCEDURE — 96372 THER/PROPH/DIAG INJ SC/IM: CPT | Performed by: NURSE PRACTITIONER

## 2023-05-20 PROCEDURE — 83690 ASSAY OF LIPASE: CPT | Performed by: STUDENT IN AN ORGANIZED HEALTH CARE EDUCATION/TRAINING PROGRAM

## 2023-05-20 PROCEDURE — 25500020 PHARM REV CODE 255: Performed by: STUDENT IN AN ORGANIZED HEALTH CARE EDUCATION/TRAINING PROGRAM

## 2023-05-20 PROCEDURE — 94761 N-INVAS EAR/PLS OXIMETRY MLT: CPT

## 2023-05-20 PROCEDURE — A9698 NON-RAD CONTRAST MATERIALNOC: HCPCS | Performed by: STUDENT IN AN ORGANIZED HEALTH CARE EDUCATION/TRAINING PROGRAM

## 2023-05-20 PROCEDURE — C9113 INJ PANTOPRAZOLE SODIUM, VIA: HCPCS | Performed by: STUDENT IN AN ORGANIZED HEALTH CARE EDUCATION/TRAINING PROGRAM

## 2023-05-20 PROCEDURE — 63600175 PHARM REV CODE 636 W HCPCS: Performed by: NURSE PRACTITIONER

## 2023-05-20 PROCEDURE — 83735 ASSAY OF MAGNESIUM: CPT | Performed by: NURSE PRACTITIONER

## 2023-05-20 RX ORDER — OXYMETAZOLINE HCL 0.05 %
2 SPRAY, NON-AEROSOL (ML) NASAL 2 TIMES DAILY
Status: DISCONTINUED | OUTPATIENT
Start: 2023-05-20 | End: 2023-05-20

## 2023-05-20 RX ORDER — PANTOPRAZOLE SODIUM 40 MG/10ML
40 INJECTION, POWDER, LYOPHILIZED, FOR SOLUTION INTRAVENOUS DAILY
Status: DISCONTINUED | OUTPATIENT
Start: 2023-05-20 | End: 2023-05-21 | Stop reason: HOSPADM

## 2023-05-20 RX ORDER — OXYMETAZOLINE HCL 0.05 %
2 SPRAY, NON-AEROSOL (ML) NASAL 2 TIMES DAILY
Status: DISCONTINUED | OUTPATIENT
Start: 2023-05-20 | End: 2023-05-21 | Stop reason: HOSPADM

## 2023-05-20 RX ORDER — LISINOPRIL 20 MG/1
20 TABLET ORAL ONCE
Status: COMPLETED | OUTPATIENT
Start: 2023-05-20 | End: 2023-05-20

## 2023-05-20 RX ORDER — CLONIDINE HYDROCHLORIDE 0.1 MG/1
0.1 TABLET ORAL EVERY 4 HOURS PRN
Status: DISCONTINUED | OUTPATIENT
Start: 2023-05-20 | End: 2023-05-21 | Stop reason: HOSPADM

## 2023-05-20 RX ORDER — LISINOPRIL 20 MG/1
40 TABLET ORAL DAILY
Status: DISCONTINUED | OUTPATIENT
Start: 2023-05-21 | End: 2023-05-21 | Stop reason: HOSPADM

## 2023-05-20 RX ADMIN — INSULIN ASPART 2 UNITS: 100 INJECTION, SOLUTION INTRAVENOUS; SUBCUTANEOUS at 04:05

## 2023-05-20 RX ADMIN — PROCHLORPERAZINE EDISYLATE 5 MG: 5 INJECTION INTRAMUSCULAR; INTRAVENOUS at 03:05

## 2023-05-20 RX ADMIN — AMPICILLIN AND SULBACTAM 3 G: 2; 1 INJECTION, POWDER, FOR SOLUTION INTRAMUSCULAR; INTRAVENOUS at 06:05

## 2023-05-20 RX ADMIN — INSULIN ASPART 1 UNITS: 100 INJECTION, SOLUTION INTRAVENOUS; SUBCUTANEOUS at 10:05

## 2023-05-20 RX ADMIN — LISINOPRIL 20 MG: 20 TABLET ORAL at 06:05

## 2023-05-20 RX ADMIN — ONDANSETRON 4 MG: 2 INJECTION INTRAMUSCULAR; INTRAVENOUS at 05:05

## 2023-05-20 RX ADMIN — HYDROCODONE BITARTRATE AND ACETAMINOPHEN 1 TABLET: 5; 325 TABLET ORAL at 03:05

## 2023-05-20 RX ADMIN — FAMOTIDINE 20 MG: 20 TABLET, FILM COATED ORAL at 09:05

## 2023-05-20 RX ADMIN — ONDANSETRON 4 MG: 2 INJECTION INTRAMUSCULAR; INTRAVENOUS at 12:05

## 2023-05-20 RX ADMIN — HYDRALAZINE HYDROCHLORIDE 10 MG: 20 INJECTION, SOLUTION INTRAMUSCULAR; INTRAVENOUS at 05:05

## 2023-05-20 RX ADMIN — AMLODIPINE BESYLATE 5 MG: 5 TABLET ORAL at 09:05

## 2023-05-20 RX ADMIN — SENNOSIDES AND DOCUSATE SODIUM 1 TABLET: 50; 8.6 TABLET ORAL at 10:05

## 2023-05-20 RX ADMIN — SODIUM CHLORIDE: 9 INJECTION, SOLUTION INTRAVENOUS at 05:05

## 2023-05-20 RX ADMIN — SIMETHICONE 80 MG: 80 TABLET, CHEWABLE ORAL at 03:05

## 2023-05-20 RX ADMIN — INSULIN DETEMIR 10 UNITS: 100 INJECTION, SOLUTION SUBCUTANEOUS at 09:05

## 2023-05-20 RX ADMIN — LISINOPRIL 20 MG: 20 TABLET ORAL at 09:05

## 2023-05-20 RX ADMIN — ENOXAPARIN SODIUM 40 MG: 40 INJECTION SUBCUTANEOUS at 04:05

## 2023-05-20 RX ADMIN — NASAL DECONGESTANT 2 SPRAY: 0.05 SPRAY NASAL at 04:05

## 2023-05-20 RX ADMIN — PANTOPRAZOLE SODIUM 40 MG: 40 INJECTION, POWDER, FOR SOLUTION INTRAVENOUS at 06:05

## 2023-05-20 RX ADMIN — SODIUM CHLORIDE: 9 INJECTION, SOLUTION INTRAVENOUS at 12:05

## 2023-05-20 RX ADMIN — IOHEXOL 500 ML: 9 SOLUTION ORAL at 07:05

## 2023-05-20 RX ADMIN — PROMETHAZINE HYDROCHLORIDE 12.5 MG: 25 INJECTION INTRAMUSCULAR; INTRAVENOUS at 04:05

## 2023-05-20 RX ADMIN — SENNOSIDES AND DOCUSATE SODIUM 1 TABLET: 50; 8.6 TABLET ORAL at 09:05

## 2023-05-20 NOTE — SUBJECTIVE & OBJECTIVE
Interval History:     Stated improvement in nausea and vomiting, able to tolerate liquid diet, will advance diet to soft diet and monitor.    Blood pressure is stable   Blood glucose improving on current regimen, will monitor and increase dose accordingly.    Monitor blood pressure, blood glucose, tolerance to diet overnight and plan for likely discharge in a.m..      Review of Systems    Constitutional:  Positive for activity change and appetite change. Negative for chills, diaphoresis, fatigue and fever.   HENT:  Negative for congestion, nosebleeds, sore throat and trouble swallowing.    Eyes:  Negative for pain, discharge and visual disturbance.   Respiratory:  Negative for apnea, cough, chest tightness, shortness of breath, wheezing and stridor.    Cardiovascular:  Negative for chest pain, palpitations and leg swelling.   Gastrointestinal:  Positive for abdominal pain, nausea and vomiting. Negative for abdominal distention, blood in stool, constipation and diarrhea.   Endocrine: Negative for cold intolerance and heat intolerance.   Genitourinary:  Negative for difficulty urinating, dysuria, flank pain, frequency and urgency.   Musculoskeletal:  Negative for arthralgias, back pain, joint swelling, myalgias, neck pain and neck stiffness.   Skin:  Negative for rash and wound.   Allergic/Immunologic: Negative for food allergies and immunocompromised state.   Neurological:  Negative for dizziness, seizures, syncope, facial asymmetry, weakness, light-headedness and headaches.   Hematological:  Negative for adenopathy.   Psychiatric/Behavioral:  Negative for agitation, behavioral problems and confusion. The patient is not nervous/anxious.    Objective:     Vital Signs (Most Recent):  Temp: 98.6 °F (37 °C) (05/20/23 0708)  Pulse: 65 (05/20/23 0708)  Resp: 18 (05/20/23 0708)  BP: (!) 115/58 (05/20/23 0708)  SpO2: 98 % (05/20/23 1027) Vital Signs (24h Range):  Temp:  [98.6 °F (37 °C)-100.2 °F (37.9 °C)] 98.6 °F (37  °C)  Pulse:  [] 65  Resp:  [15-20] 18  SpO2:  [95 %-98 %] 98 %  BP: ()/(53-87) 115/58     Weight: 63.4 kg (139 lb 12.4 oz)  Body mass index is 26.39 kg/m².    Intake/Output Summary (Last 24 hours) at 5/20/2023 1129  Last data filed at 5/20/2023 0752  Gross per 24 hour   Intake 3280.47 ml   Output 700 ml   Net 2580.47 ml         Physical Exam      Constitutional:       Appearance: He is well-developed.   HENT:      Head: Normocephalic and atraumatic.      Nose: Nose normal.   Eyes:      General: No scleral icterus.  Cardiovascular:      Rate and Rhythm: Normal rate and regular rhythm.      Heart sounds: Normal heart sounds. No murmur heard.    No friction rub. No gallop.   Pulmonary:      Effort: Pulmonary effort is normal. No respiratory distress.      Breath sounds: Normal breath sounds. No wheezing.   Abdominal:      General: Bowel sounds are normal. There is no distension.      Palpations: Abdomen is soft.      Tenderness: There is no abdominal tenderness.   Musculoskeletal:         General: Normal range of motion.      Cervical back: Normal range of motion and neck supple.   Skin:     General: Skin is warm and dry.      Findings: No rash.   Neurological:      Mental Status: He is alert and oriented to person, place, and time.      Cranial Nerves: No cranial nerve deficit.   Psychiatric:         Behavior: Behavior normal.         Significant Labs: All pertinent labs within the past 24 hours have been reviewed.  CBC:   Recent Labs   Lab 05/18/23 2020 05/19/23  0452 05/20/23  0758   WBC 10.28 13.21* 7.90   HGB 16.5 15.9 13.1*   HCT 46.4 45.0 39.8*    206 174     CMP:   Recent Labs   Lab 05/18/23 2020 05/20/23  0758   * 139   K 4.5 3.9   CL 92* 110   CO2 21* 22*   * 147*   BUN 34* 19   CREATININE 1.3 1.1   CALCIUM 9.4 8.2*   PROT 8.3 5.4*   ALBUMIN 4.6 3.0*   BILITOT 1.3* 1.2*   ALKPHOS 71 43*   AST 39 16   ALT 23 14   ANIONGAP 19* 7*       Significant Imaging:     Imaging Results               CT Abdomen Pelvis With Contrast (Final result)  Result time 05/18/23 23:33:57      Final result by Wil Duckworth MD (05/18/23 23:33:57)                   Impression:      No definite acute abnormality identified.  Correlation and further evaluation as warranted.    All CT scans at this facility are performed  using dose modulation techniques as appropriate to performed exam including the following:  automated exposure control; adjustment of mA and/or kV according to the patients size (this includes techniques or standardized protocols for targeted exams where dose is matched to indication/reason for exam: i.e. extremities or head);  iterative reconstruction technique.      Electronically signed by: Wil Duckworth  Date:    05/18/2023  Time:    23:33               Narrative:    EXAMINATION:  CT ABDOMEN PELVIS WITH CONTRAST    CLINICAL HISTORY:  Nausea/vomiting;    TECHNIQUE:  Low dose axial images, sagittal and coronal reformations were obtained from the lung bases to the pubic symphysis.  Contrast was administered.  Images acquired after the administration 100 mL Omnipaque 350 IV contrast.    COMPARISON:  Multiple priors.    FINDINGS:  Heart: Normal in size. No pericardial effusion.    Lung Bases: Well aerated, without consolidation or pleural fluid.    Liver: Normal in size and attenuation, with no focal hepatic lesions.    Gallbladder: No calcified gallstones.    Bile Ducts: No evidence of dilated ducts.    Pancreas: No mass or peripancreatic fat stranding.    Spleen: Unremarkable.    Adrenals: Unremarkable.    Kidneys/ Ureters: No hydronephrosis.  No obstructive uropathy.  No nonobstructive nephrolithiasis.    Bladder: No evidence of wall thickening.    Reproductive organs: Unremarkable.    GI Tract/Mesentery: No evidence of bowel obstruction or inflammation.  No evidence of appendicitis.    Peritoneal Space: No ascites. No free air.    Retroperitoneum: No significant adenopathy.    Abdominal wall:  Unremarkable.    Vasculature: No significant atherosclerosis or aneurysm.    Bones: No acute fracture.  Multifocal degenerative changes.

## 2023-05-20 NOTE — PROGRESS NOTES
[facility] [place]  Progress Note    Patient Name: Russell Orellana  MRN: [id number]  Patient Class: OP- Observation   Admission Date: 5/18/2023  Length of Stay: 0 days  Attending Physician: Adriana Duffy, *  Primary Care Provider: Primary Doctor No        Subjective:     Principal Problem:Intractable nausea and vomiting        HPI:  The patient is a 56 yo  -speaking male with IDDM, HTN, HLD, noncompliant with medications who presented to ED with N/V and abdominal pain -onset 3 days ago that gradually worsened. Language Line used. Pt reports abdominal pain described as upper abdominal cramping on/off rated up to 10/10 pain associated with N/V. Pt reports he has been out of all his medications for over one month.     In the ED, /119, Mild tachycardia, WBC normal, Afebrile. Na 132, +anion gap acidosis, Glucose 313. UA +3 ketones. Venous ABG showed normal PH. CT abdomen showed nothing acute.     The patient was given IV Hydralazine, IV Morphine, IV Zofran, and IV Phenergan. BP and symptoms improved.     The patient will be placed in observation under hospital medicine       Overview/Hospital Course:   56 yo  -speaking male with IDDM, HTN, HLD, noncompliant with medications who presented to ED with N/V and abdominal pain -onset 3 days ago that gradually worsened. Language Line used. Pt reports abdominal pain described as upper abdominal cramping on/off rated up to 10/10 pain associated with N/V. Pt reports he has been out of all his medications for over one month.      In the ED, /119, Mild tachycardia, WBC normal, Afebrile. Na 132, +anion gap acidosis, Glucose 313. UA +3 ketones. Venous ABG showed normal PH. CT abdomen showed nothing acute.      Patient has been started back on home blood pressure regimen, improved.    A1c 9.8, started on detemir 10 units;    Patient has 1 refill and was delivered to bed.  Patient has been provided information on affordable option-  [employer] sells 70/30 insulin pen over the counter.           Interval History:     Stated improvement in nausea and vomiting, able to tolerate liquid diet, will advance diet to soft diet and monitor.    Blood pressure is stable   Blood glucose improving on current regimen, will monitor and increase dose accordingly.    Monitor blood pressure, blood glucose, tolerance to diet overnight and plan for likely discharge in a.m..      Review of Systems    Constitutional:  Positive for activity change and appetite change. Negative for chills, diaphoresis, fatigue and fever.   HENT:  Negative for congestion, nosebleeds, sore throat and trouble swallowing.    Eyes:  Negative for pain, discharge and visual disturbance.   Respiratory:  Negative for apnea, cough, chest tightness, shortness of breath, wheezing and stridor.    Cardiovascular:  Negative for chest pain, palpitations and leg swelling.   Gastrointestinal:  Positive for abdominal pain, nausea and vomiting. Negative for abdominal distention, blood in stool, constipation and diarrhea.   Endocrine: Negative for cold intolerance and heat intolerance.   Genitourinary:  Negative for difficulty urinating, dysuria, flank pain, frequency and urgency.   Musculoskeletal:  Negative for arthralgias, back pain, joint swelling, myalgias, neck pain and neck stiffness.   Skin:  Negative for rash and wound.   Allergic/Immunologic: Negative for food allergies and immunocompromised state.   Neurological:  Negative for dizziness, seizures, syncope, facial asymmetry, weakness, light-headedness and headaches.   Hematological:  Negative for adenopathy.   Psychiatric/Behavioral:  Negative for agitation, behavioral problems and confusion. The patient is not nervous/anxious.    Objective:     Vital Signs (Most Recent):  Temp: 98.6 °F (37 °C) (05/20/23 0708)  Pulse: 65 (05/20/23 0708)  Resp: 18 (05/20/23 0708)  BP: (!) 115/58 (05/20/23 0708)  SpO2: 98 % (05/20/23 1027) Vital Signs (24h  Range):  Temp:  [98.6 °F (37 °C)-100.2 °F (37.9 °C)] 98.6 °F (37 °C)  Pulse:  [] 65  Resp:  [15-20] 18  SpO2:  [95 %-98 %] 98 %  BP: ()/(53-87) 115/58     Weight: 63.4 kg (139 lb 12.4 oz)  Body mass index is 26.39 kg/m².    Intake/Output Summary (Last 24 hours) at 5/20/2023 1129  Last data filed at 5/20/2023 0752  Gross per 24 hour   Intake 3280.47 ml   Output 700 ml   Net 2580.47 ml         Physical Exam      Constitutional:       Appearance: He is well-developed.   HENT:      Head: Normocephalic and atraumatic.      Nose: Nose normal.   Eyes:      General: No scleral icterus.  Cardiovascular:      Rate and Rhythm: Normal rate and regular rhythm.      Heart sounds: Normal heart sounds. No murmur heard.    No friction rub. No gallop.   Pulmonary:      Effort: Pulmonary effort is normal. No respiratory distress.      Breath sounds: Normal breath sounds. No wheezing.   Abdominal:      General: Bowel sounds are normal. There is no distension.      Palpations: Abdomen is soft.      Tenderness: There is no abdominal tenderness.   Musculoskeletal:         General: Normal range of motion.      Cervical back: Normal range of motion and neck supple.   Skin:     General: Skin is warm and dry.      Findings: No rash.   Neurological:      Mental Status: He is alert and oriented to person, place, and time.      Cranial Nerves: No cranial nerve deficit.   Psychiatric:         Behavior: Behavior normal.         Significant Labs: All pertinent labs within the past 24 hours have been reviewed.  CBC:   Recent Labs   Lab 05/18/23 2020 05/19/23  0452 05/20/23  0758   WBC 10.28 13.21* 7.90   HGB 16.5 15.9 13.1*   HCT 46.4 45.0 39.8*    206 174     CMP:   Recent Labs   Lab 05/18/23 2020 05/20/23  0758   * 139   K 4.5 3.9   CL 92* 110   CO2 21* 22*   * 147*   BUN 34* 19   CREATININE 1.3 1.1   CALCIUM 9.4 8.2*   PROT 8.3 5.4*   ALBUMIN 4.6 3.0*   BILITOT 1.3* 1.2*   ALKPHOS 71 43*   AST 39 16   ALT 23 14    ANIONGAP 19* 7*       Significant Imaging:     Imaging Results              CT Abdomen Pelvis With Contrast (Final result)  Result time 05/18/23 23:33:57      Final result by Wil Duckworth MD (05/18/23 23:33:57)                   Impression:      No definite acute abnormality identified.  Correlation and further evaluation as warranted.    All CT scans at this facility are performed  using dose modulation techniques as appropriate to performed exam including the following:  automated exposure control; adjustment of mA and/or kV according to the patients size (this includes techniques or standardized protocols for targeted exams where dose is matched to indication/reason for exam: i.e. extremities or head);  iterative reconstruction technique.      Electronically signed by: Wil Duckworth  Date:    05/18/2023  Time:    23:33               Narrative:    EXAMINATION:  CT ABDOMEN PELVIS WITH CONTRAST    CLINICAL HISTORY:  Nausea/vomiting;    TECHNIQUE:  Low dose axial images, sagittal and coronal reformations were obtained from the lung bases to the pubic symphysis.  Contrast was administered.  Images acquired after the administration 100 mL Omnipaque 350 IV contrast.    COMPARISON:  Multiple priors.    FINDINGS:  Heart: Normal in size. No pericardial effusion.    Lung Bases: Well aerated, without consolidation or pleural fluid.    Liver: Normal in size and attenuation, with no focal hepatic lesions.    Gallbladder: No calcified gallstones.    Bile Ducts: No evidence of dilated ducts.    Pancreas: No mass or peripancreatic fat stranding.    Spleen: Unremarkable.    Adrenals: Unremarkable.    Kidneys/ Ureters: No hydronephrosis.  No obstructive uropathy.  No nonobstructive nephrolithiasis.    Bladder: No evidence of wall thickening.    Reproductive organs: Unremarkable.    GI Tract/Mesentery: No evidence of bowel obstruction or inflammation.  No evidence of appendicitis.    Peritoneal Space: No ascites. No free  air.    Retroperitoneum: No significant adenopathy.    Abdominal wall: Unremarkable.    Vasculature: No significant atherosclerosis or aneurysm.    Bones: No acute fracture.  Multifocal degenerative changes.                                         Assessment/Plan:      * Intractable nausea and vomiting  Likely 2/2 diabetic gastroparesis from noncompliance with insulin   Scheduled IV Zofran   IV Compazine and phenergan prn   IVfs        Increased anion gap metabolic acidosis  Likely 2/2 hyperglycemia and dehydration from vomiting   Cont IVfs  Monitor labs and electrolytes       Hypertensive urgency  Patient has a current diagnosis of hypertensive urgency (without evidence of end organ damage) which is uncontrolled.  Latest blood pressure and vitals reviewed-   Temp:  [98 °F (36.7 °C)]   Pulse:  []   Resp:  [16-18]   BP: (112-248)/()   SpO2:  [96 %-99 %] .   Patient currently off IV antihypertensives.   Home meds for hypertension were reviewed and noted below.   Hypertension Medications             amLODIPine (NORVASC) 5 MG tablet Take 1 tablet (5 mg total) by mouth once daily.    lisinopriL (PRINIVIL,ZESTRIL) 20 MG tablet Take 1 tablet (20 mg total) by mouth once daily.          Medication adjustment for hospital antihypertensives is as follows- restart home meds Amlodipine and Lisinopril   IV Hydralazine prn     Will aim for controlled BP reduction by medications noted above. Monitor and mitigate end organ damage as indicated.    Abdominal pain  Likely 2/2 diabetic gastroparesis   Improved       Type 2 diabetes mellitus with hyperglycemia, with long-term current use of insulin  Patient's FSGs are uncontrolled due to hyperglycemia on current medication regimen.  Last A1c reviewed-   Lab Results   Component Value Date    HGBA1C 10.5 (H) 11/28/2022     Most recent fingerstick glucose reviewed-   Recent Labs   Lab 05/19/23  0042 05/19/23  0332   POCTGLUCOSE 236* 234*     Current correctional scale   Low  Maintain anti-hyperglycemic dose as follows-   Antihyperglycemics (From admission, onward)    Start     Stop Route Frequency Ordered    05/19/23 0416  insulin aspart U-100 pen 0-5 Units         -- SubQ Before meals & nightly PRN 05/19/23 0317 05/19/23 0315  insulin detemir U-100 (Levemir) pen 10 Units         -- SubQ Daily 05/19/23 0313        Hold Oral hypoglycemics while patient is in the hospital.    Levemir 10 untis SQ daily -titrate prn       VTE Risk Mitigation (From admission, onward)         Ordered     enoxaparin injection 40 mg  Daily         05/19/23 0317     IP VTE LOW RISK PATIENT  Once         05/19/23 0317     Place sequential compression device  Until discontinued         05/19/23 0317                Discharge Planning   KARLA:      Code Status: Full Code   Is the patient medically ready for discharge?:     Reason for patient still in hospital (select all that apply): monitor clinical improvement                      Adriana Duffy MD  Department of Hospital Medicine   [facility]

## 2023-05-20 NOTE — PROGRESS NOTES
Pharmacist Renal Dose Adjustment Note    Russell Orellana is a 57 y.o. male being treated with the medication Unasyn    Patient Data:    Vital Signs (Most Recent):  Temp: 98.7 °F (37.1 °C) (05/20/23 1732)  Pulse: 101 (05/20/23 1732)  Resp: 20 (05/20/23 1732)  BP: (!) 180/75 (05/20/23 1756)  SpO2: 98 % (05/20/23 1732) Vital Signs (72h Range):  Temp:  [98 °F (36.7 °C)-100.2 °F (37.9 °C)]   Pulse:  []   Resp:  [15-20]   BP: ()/()   SpO2:  [95 %-99 %]      Recent Labs   Lab 05/18/23  2020 05/20/23  0758   CREATININE 1.3 1.1     Serum creatinine: 1.1 mg/dL 05/20/23 0758  Estimated creatinine clearance: 59.5 mL/min    Medication:Unasyn 1.5 g q12h was changed to Unasyn 3 g q6h per protocol / CrCL > 50.    Pharmacist's Name: Manoj Crane  Pharmacist's Extension: [phone]

## 2023-05-20 NOTE — HOSPITAL COURSE
58 yo  -speaking male with IDDM, HTN, HLD, noncompliant with medications who presented to ED with N/V and abdominal pain -onset 3 days ago that gradually worsened. Language Line used. Pt reports abdominal pain described as upper abdominal cramping on/off rated up to 10/10 pain associated with N/V. Pt reports he has been out of all his medications for over one month.      In the ED, /119, Mild tachycardia, WBC normal, Afebrile. Na 132, +anion gap acidosis, Glucose 313. UA +3 ketones. Venous ABG showed normal PH. CT abdomen showed nothing acute.      Patient has been started back on home blood pressure regimen, improved.    A1c 9.8, started on detemir 10 units;    Patient has 1 refill and was delivered to bed.  Patient has been provided information on affordable option- [employer] sells 70/30 insulin pen over the counter.       5/21   Examination done bedside, patient denied acute issues overnight, denied nausea, vomiting, headache, dizziness, chest pain, palpitations, bowel or bladder issues.    Noted to have an episode of nausea, vomiting, abdominal pain yesterday evening, stat CT abdomen pelvis did not show acute intra abdominal pathology, patient remained afebrile, lactic acid within normal limit, Bmp showed stable findings, ; today, patient remained stable, denied any further episodes of nausea, vomiting, abdominal pain, advance diet from full liquid to soft diet, patient was able to tolerate diet without issues.    Blood pressure remained stable   Remained afebrile, no leukocytosis.    Considering clinical and hemodynamic stability, planning to discharge patient today, using , emphasized on compliance with blood pressure and diabetic medications, ordered for glucometer/ supplies and emphasized on blood glucose checks at home daily, also ordered for blood pressure cuff; patient stated that he understands the situation and is going to be compliant with medications.  Agreed to the  plan of discharge.  Discussed with  to arrange for primary care.    Ordered for NP at home program, medications were sent to patient preferred pharmacy;

## 2023-05-20 NOTE — PLAN OF CARE
Discussed plan of care with pt. Pt verbalized understanding. No signs of acute distress. Pt reports no nausea and vomiting on this shift. Bed alarm refused with bed at lowest position. Tele monitor 8638 in place. Call light within reach. Purposeful rounding Q2h.      Chart check complete.     Problem: Adult Inpatient Plan of Care  Goal: Plan of Care Review  Outcome: Ongoing, Progressing  Goal: Patient-Specific Goal (Individualized)  Outcome: Ongoing, Progressing  Goal: Absence of Hospital-Acquired Illness or Injury  Outcome: Ongoing, Progressing  Goal: Optimal Comfort and Wellbeing  Outcome: Ongoing, Progressing  Goal: Readiness for Transition of Care  Outcome: Ongoing, Progressing     Problem: Infection  Goal: Absence of Infection Signs and Symptoms  Outcome: Ongoing, Progressing     Problem: Diabetes Comorbidity  Goal: Blood Glucose Level Within Targeted Range  Outcome: Ongoing, Progressing     Problem: Pain Acute  Goal: Acceptable Pain Control and Functional Ability  Outcome: Ongoing, Progressing     Problem: Fatigue  Goal: Improved Activity Tolerance  Outcome: Ongoing, Progressing

## 2023-05-21 VITALS
BODY MASS INDEX: 26.39 KG/M2 | DIASTOLIC BLOOD PRESSURE: 62 MMHG | WEIGHT: 139.75 LBS | TEMPERATURE: 98 F | HEIGHT: 61 IN | SYSTOLIC BLOOD PRESSURE: 130 MMHG | HEART RATE: 83 BPM | OXYGEN SATURATION: 98 % | RESPIRATION RATE: 18 BRPM

## 2023-05-21 LAB
ANION GAP SERPL CALC-SCNC: 10 MMOL/L (ref 8–16)
BASOPHILS # BLD AUTO: 0.05 K/UL (ref 0–0.2)
BASOPHILS NFR BLD: 0.6 % (ref 0–1.9)
BUN SERPL-MCNC: 14 MG/DL (ref 6–20)
CALCIUM SERPL-MCNC: 8.4 MG/DL (ref 8.7–10.5)
CHLORIDE SERPL-SCNC: 102 MMOL/L (ref 95–110)
CO2 SERPL-SCNC: 22 MMOL/L (ref 23–29)
CREAT SERPL-MCNC: 0.9 MG/DL (ref 0.5–1.4)
DIFFERENTIAL METHOD: NORMAL
EOSINOPHIL # BLD AUTO: 0.1 K/UL (ref 0–0.5)
EOSINOPHIL NFR BLD: 0.7 % (ref 0–8)
ERYTHROCYTE [DISTWIDTH] IN BLOOD BY AUTOMATED COUNT: 11.7 % (ref 11.5–14.5)
EST. GFR  (NO RACE VARIABLE): >60 ML/MIN/1.73 M^2
GLUCOSE SERPL-MCNC: 166 MG/DL (ref 70–110)
HCT VFR BLD AUTO: 40.8 % (ref 40–54)
HGB BLD-MCNC: 14.4 G/DL (ref 14–18)
IMM GRANULOCYTES # BLD AUTO: 0.01 K/UL (ref 0–0.04)
IMM GRANULOCYTES NFR BLD AUTO: 0.1 % (ref 0–0.5)
LYMPHOCYTES # BLD AUTO: 2 K/UL (ref 1–4.8)
LYMPHOCYTES NFR BLD: 22.8 % (ref 18–48)
MCH RBC QN AUTO: 29.6 PG (ref 27–31)
MCHC RBC AUTO-ENTMCNC: 35.3 G/DL (ref 32–36)
MCV RBC AUTO: 84 FL (ref 82–98)
MONOCYTES # BLD AUTO: 0.9 K/UL (ref 0.3–1)
MONOCYTES NFR BLD: 10.4 % (ref 4–15)
NEUTROPHILS # BLD AUTO: 5.8 K/UL (ref 1.8–7.7)
NEUTROPHILS NFR BLD: 65.4 % (ref 38–73)
NRBC BLD-RTO: 0 /100 WBC
PLATELET # BLD AUTO: 192 K/UL (ref 150–450)
PMV BLD AUTO: 10.6 FL (ref 9.2–12.9)
POCT GLUCOSE: 158 MG/DL (ref 70–110)
POCT GLUCOSE: 165 MG/DL (ref 70–110)
POTASSIUM SERPL-SCNC: 3.3 MMOL/L (ref 3.5–5.1)
RBC # BLD AUTO: 4.86 M/UL (ref 4.6–6.2)
SODIUM SERPL-SCNC: 134 MMOL/L (ref 136–145)
WBC # BLD AUTO: 8.9 K/UL (ref 3.9–12.7)

## 2023-05-21 PROCEDURE — 80048 BASIC METABOLIC PNL TOTAL CA: CPT | Performed by: STUDENT IN AN ORGANIZED HEALTH CARE EDUCATION/TRAINING PROGRAM

## 2023-05-21 PROCEDURE — 25000003 PHARM REV CODE 250: Performed by: STUDENT IN AN ORGANIZED HEALTH CARE EDUCATION/TRAINING PROGRAM

## 2023-05-21 PROCEDURE — 25000003 PHARM REV CODE 250: Performed by: NURSE PRACTITIONER

## 2023-05-21 PROCEDURE — 63600175 PHARM REV CODE 636 W HCPCS: Performed by: NURSE PRACTITIONER

## 2023-05-21 PROCEDURE — 63600175 PHARM REV CODE 636 W HCPCS: Performed by: STUDENT IN AN ORGANIZED HEALTH CARE EDUCATION/TRAINING PROGRAM

## 2023-05-21 PROCEDURE — 36415 COLL VENOUS BLD VENIPUNCTURE: CPT | Performed by: STUDENT IN AN ORGANIZED HEALTH CARE EDUCATION/TRAINING PROGRAM

## 2023-05-21 PROCEDURE — 85025 COMPLETE CBC W/AUTO DIFF WBC: CPT | Performed by: STUDENT IN AN ORGANIZED HEALTH CARE EDUCATION/TRAINING PROGRAM

## 2023-05-21 PROCEDURE — C9113 INJ PANTOPRAZOLE SODIUM, VIA: HCPCS | Performed by: STUDENT IN AN ORGANIZED HEALTH CARE EDUCATION/TRAINING PROGRAM

## 2023-05-21 RX ORDER — INSULIN PUMP SYRINGE, 3 ML
EACH MISCELLANEOUS
Qty: 1 EACH | Refills: 0 | Status: SHIPPED | OUTPATIENT
Start: 2023-05-21 | End: 2024-05-20

## 2023-05-21 RX ORDER — ACETAMINOPHEN 500 MG
1 TABLET ORAL DAILY
Qty: 1 EACH | Refills: 0 | Status: SHIPPED | OUTPATIENT
Start: 2023-05-21

## 2023-05-21 RX ORDER — PANTOPRAZOLE SODIUM 40 MG/1
40 TABLET, DELAYED RELEASE ORAL DAILY
Qty: 20 TABLET | Refills: 0 | Status: SHIPPED | OUTPATIENT
Start: 2023-05-21 | End: 2023-06-10

## 2023-05-21 RX ORDER — INSULIN ASPART 100 [IU]/ML
8 INJECTION, SUSPENSION SUBCUTANEOUS
Qty: 3 ML | Refills: 1 | Status: SHIPPED | OUTPATIENT
Start: 2023-05-21 | End: 2023-06-20

## 2023-05-21 RX ORDER — AMLODIPINE BESYLATE 5 MG/1
5 TABLET ORAL DAILY
Qty: 30 TABLET | Refills: 0 | Status: CANCELLED | OUTPATIENT
Start: 2023-05-21 | End: 2023-06-20

## 2023-05-21 RX ORDER — PEN NEEDLE, DIABETIC 30 GX3/16"
100 NEEDLE, DISPOSABLE MISCELLANEOUS DAILY
Qty: 100 EACH | Refills: 0 | Status: SHIPPED | OUTPATIENT
Start: 2023-05-21

## 2023-05-21 RX ORDER — POTASSIUM CHLORIDE 20 MEQ/1
40 TABLET, EXTENDED RELEASE ORAL ONCE
Status: COMPLETED | OUTPATIENT
Start: 2023-05-21 | End: 2023-05-21

## 2023-05-21 RX ORDER — AMLODIPINE BESYLATE 5 MG/1
5 TABLET ORAL DAILY
Qty: 30 TABLET | Refills: 0 | Status: SHIPPED | OUTPATIENT
Start: 2023-05-21 | End: 2023-06-20

## 2023-05-21 RX ORDER — LISINOPRIL 40 MG/1
40 TABLET ORAL DAILY
Qty: 30 TABLET | Refills: 0 | Status: SHIPPED | OUTPATIENT
Start: 2023-05-22 | End: 2023-06-21

## 2023-05-21 RX ADMIN — AMLODIPINE BESYLATE 5 MG: 5 TABLET ORAL at 09:05

## 2023-05-21 RX ADMIN — INSULIN DETEMIR 10 UNITS: 100 INJECTION, SOLUTION SUBCUTANEOUS at 09:05

## 2023-05-21 RX ADMIN — ONDANSETRON 4 MG: 2 INJECTION INTRAMUSCULAR; INTRAVENOUS at 12:05

## 2023-05-21 RX ADMIN — SENNOSIDES AND DOCUSATE SODIUM 1 TABLET: 50; 8.6 TABLET ORAL at 09:05

## 2023-05-21 RX ADMIN — PANTOPRAZOLE SODIUM 40 MG: 40 INJECTION, POWDER, FOR SOLUTION INTRAVENOUS at 09:05

## 2023-05-21 RX ADMIN — POTASSIUM CHLORIDE 40 MEQ: 1500 TABLET, EXTENDED RELEASE ORAL at 09:05

## 2023-05-21 RX ADMIN — ONDANSETRON 4 MG: 2 INJECTION INTRAMUSCULAR; INTRAVENOUS at 05:05

## 2023-05-21 RX ADMIN — HYDRALAZINE HYDROCHLORIDE 10 MG: 20 INJECTION, SOLUTION INTRAMUSCULAR; INTRAVENOUS at 04:05

## 2023-05-21 RX ADMIN — AMPICILLIN AND SULBACTAM 3 G: 2; 1 INJECTION, POWDER, FOR SOLUTION INTRAMUSCULAR; INTRAVENOUS at 12:05

## 2023-05-21 RX ADMIN — NASAL DECONGESTANT 2 SPRAY: 0.05 SPRAY NASAL at 09:05

## 2023-05-21 RX ADMIN — LISINOPRIL 40 MG: 20 TABLET ORAL at 09:05

## 2023-05-21 RX ADMIN — AMPICILLIN AND SULBACTAM 3 G: 2; 1 INJECTION, POWDER, FOR SOLUTION INTRAMUSCULAR; INTRAVENOUS at 10:05

## 2023-05-21 RX ADMIN — AMPICILLIN AND SULBACTAM 3 G: 2; 1 INJECTION, POWDER, FOR SOLUTION INTRAMUSCULAR; INTRAVENOUS at 01:05

## 2023-05-21 NOTE — DISCHARGE SUMMARY
[facility] Medicine  Discharge Summary      Patient Name: Russell Orellana  MRN: [id number]  [facility]: [id number]  Patient Class: IP- Inpatient  Admission Date: 5/18/2023  Hospital Length of Stay: 1 days  Discharge Date and Time: 5/21/2023  Attending Physician: Adriana Duffy, *   Discharging Provider: Adriana Duffy MD  Primary Care Provider: Primary Doctor Tara    Primary Care Team: [facility] MEDICINE D    HPI:   The patient is a 58 yo  -speaking male with IDDM, HTN, HLD, noncompliant with medications who presented to ED with N/V and abdominal pain -onset 3 days ago that gradually worsened. Language Line used. Pt reports abdominal pain described as upper abdominal cramping on/off rated up to 10/10 pain associated with N/V. Pt reports he has been out of all his medications for over one month.     In the ED, /119, Mild tachycardia, WBC normal, Afebrile. Na 132, +anion gap acidosis, Glucose 313. UA +3 ketones. Venous ABG showed normal PH. CT abdomen showed nothing acute.     The patient was given IV Hydralazine, IV Morphine, IV Zofran, and IV Phenergan. BP and symptoms improved.     The patient will be placed in observation under hospital medicine       * No surgery found *      Hospital Course:    58 yo  -speaking male with IDDM, HTN, HLD, noncompliant with medications who presented to ED with N/V and abdominal pain -onset 3 days ago that gradually worsened. Language Line used. Pt reports abdominal pain described as upper abdominal cramping on/off rated up to 10/10 pain associated with N/V. Pt reports he has been out of all his medications for over one month.      In the ED, /119, Mild tachycardia, WBC normal, Afebrile. Na 132, +anion gap acidosis, Glucose 313. UA +3 ketones. Venous ABG showed normal PH. CT abdomen showed nothing acute.      Patient has been started back on home blood pressure regimen, improved.    A1c 9.8, started on detemir 10 units;    Patient  has 1 refill and was delivered to bed.  Patient has been provided information on affordable option- [employer] sells 70/30 insulin pen over the counter.       5/21   Examination done bedside, patient denied acute issues overnight, denied nausea, vomiting, headache, dizziness, chest pain, palpitations, bowel or bladder issues.    Noted to have an episode of nausea, vomiting, abdominal pain yesterday evening, stat CT abdomen pelvis did not show acute intra abdominal pathology, patient remained afebrile, lactic acid within normal limit, Bmp showed stable findings, ; today, patient remained stable, denied any further episodes of nausea, vomiting, abdominal pain, advance diet from full liquid to soft diet, patient was able to tolerate diet without issues.    Blood pressure remained stable   Remained afebrile, no leukocytosis.    Considering clinical and hemodynamic stability, planning to discharge patient today, using , emphasized on compliance with blood pressure and diabetic medications, ordered for glucometer/ supplies and emphasized on blood glucose checks at home daily, also ordered for blood pressure cuff; patient stated that he understands the situation and is going to be compliant with medications.  Agreed to the plan of discharge.  Discussed with  to arrange for primary care.    Ordered for NP at home program, medications were sent to patient preferred pharmacy;           Review of Systems     Constitutional:  . Negative for chills, diaphoresis, fatigue and fever.   HENT:  Negative for congestion, nosebleeds, sore throat and trouble swallowing.    Eyes:  Negative for pain, discharge and visual disturbance.   Respiratory:  Negative for apnea, cough, chest tightness, shortness of breath, wheezing and stridor.    Cardiovascular:  Negative for chest pain, palpitations and leg swelling.   Gastrointestinal:   Negative for abdominal distention, blood in stool, constipation and diarrhea.    Endocrine: Negative for cold intolerance and heat intolerance.   Genitourinary:  Negative for difficulty urinating, dysuria, flank pain, frequency and urgency.   Musculoskeletal:  Negative for arthralgias, back pain, joint swelling, myalgias, neck pain and neck stiffness.   Skin:  Negative for rash and wound.   Allergic/Immunologic: Negative for food allergies and immunocompromised state.   Neurological:  Negative for dizziness, seizures, syncope, facial asymmetry, weakness, light-headedness and headaches.   Hematological:  Negative for adenopathy.   Psychiatric/Behavioral:  Negative for agitation, behavioral problems and confusion. The patient is not nervous/anxious.    Physical Exam      Constitutional:       Appearance: He is well-developed.   HENT:      Head: Normocephalic and atraumatic.      Nose: Nose normal.   Eyes:      General: No scleral icterus.  Cardiovascular:      Rate and Rhythm: Normal rate and regular rhythm.      Heart sounds: Normal heart sounds. No murmur heard.    No friction rub. No gallop.   Pulmonary:      Effort: Pulmonary effort is normal. No respiratory distress.      Breath sounds: Normal breath sounds. No wheezing.   Abdominal:      General: Bowel sounds are normal. There is no distension.      Palpations: Abdomen is soft.      Tenderness: There is no abdominal tenderness.   Musculoskeletal:         General: Normal range of motion.      Cervical back: Normal range of motion and neck supple.   Skin:     General: Skin is warm and dry.      Findings: No rash.   Neurological:      Mental Status: He is alert and oriented to person, place, and time.      Cranial Nerves: No cranial nerve deficit.   Psychiatric:         Behavior: Behavior normal.        Goals of Care Treatment Preferences:  Code Status: Full Code      Consults:   Consults (From admission, onward)        Status Ordering Provider     Inpatient consult to Diabetes educator  Once        Provider:  (Not yet assigned)    Completed  CRIS MACIAS          No new Assessment & Plan notes have been filed under this hospital service since the last note was generated.  Service: Hospital Medicine    Final Active Diagnoses:    Diagnosis Date Noted POA    PRINCIPAL PROBLEM:  Intractable nausea and vomiting [R11.2] 05/19/2023 Yes    Increased anion gap metabolic acidosis [E87.29] 05/19/2023 Yes    Abdominal pain [R10.9] 11/27/2022 Unknown    Type 2 diabetes mellitus with hyperglycemia, with long-term current use of insulin [E11.65, Z79.4] 11/27/2022 Not Applicable    Hypertensive urgency [I16.0] 11/27/2022 Yes      Problems Resolved During this Admission:       Discharged Condition: stable    Disposition:     Follow Up:   Follow-up Information     Primary Doctor No Follow up in 1 week(s).                     Patient Instructions:      Ambulatory referral/consult to [facility]   Standing Status: Future   Referral Priority: Routine Referral Type: Consultation   Referral Reason: Specialty Services Required   Number of Visits Requested: 1       Significant Diagnostic Studies:     Results for orders placed or performed during the hospital encounter of 05/18/23   Blood culture    Specimen: Blood   Result Value Ref Range    Blood Culture, Routine No Growth to date    Blood culture    Specimen: Blood   Result Value Ref Range    Blood Culture, Routine No Growth to date    CBC Auto Differential   Result Value Ref Range    WBC 10.28 3.90 - 12.70 K/uL    RBC 5.60 4.60 - 6.20 M/uL    Hemoglobin 16.5 14.0 - 18.0 g/dL    Hematocrit 46.4 40.0 - 54.0 %    MCV 83 82 - 98 fL    MCH 29.5 27.0 - 31.0 pg    MCHC 35.6 32.0 - 36.0 g/dL    RDW 11.9 11.5 - 14.5 %    Platelets 206 150 - 450 K/uL    MPV 10.7 9.2 - 12.9 fL    Immature Granulocytes 0.4 0.0 - 0.5 %    Gran # (ANC) 8.8 (H) 1.8 - 7.7 K/uL    Immature Grans (Abs) 0.04 0.00 - 0.04 K/uL    Lymph # 0.9 (L) 1.0 - 4.8 K/uL    Mono # 0.5 0.3 - 1.0 K/uL    Eos # 0.0 0.0 - 0.5 K/uL    Baso # 0.03 0.00 -  0.20 K/uL    nRBC 0 0 /100 WBC    Gran % 85.7 (H) 38.0 - 73.0 %    Lymph % 9.1 (L) 18.0 - 48.0 %    Mono % 4.5 4.0 - 15.0 %    Eosinophil % 0.0 0.0 - 8.0 %    Basophil % 0.3 0.0 - 1.9 %    Differential Method Automated    Comprehensive Metabolic Panel   Result Value Ref Range    Sodium 132 (L) 136 - 145 mmol/L    Potassium 4.5 3.5 - 5.1 mmol/L    Chloride 92 (L) 95 - 110 mmol/L    CO2 21 (L) 23 - 29 mmol/L    Glucose 313 (H) 70 - 110 mg/dL    BUN 34 (H) 6 - 20 mg/dL    Creatinine 1.3 0.5 - 1.4 mg/dL    Calcium 9.4 8.7 - 10.5 mg/dL    Total Protein 8.3 6.0 - 8.4 g/dL    Albumin 4.6 3.5 - 5.2 g/dL    Total Bilirubin 1.3 (H) 0.1 - 1.0 mg/dL    Alkaline Phosphatase 71 55 - 135 U/L    AST 39 10 - 40 U/L    ALT 23 10 - 44 U/L    Anion Gap 19 (H) 8 - 16 mmol/L    eGFR >60 >60 mL/min/1.73 m^2   Lipase   Result Value Ref Range    Lipase 19 4 - 60 U/L   Urinalysis, Reflex to Urine Culture Urine, Clean Catch    Specimen: Urine   Result Value Ref Range    Specimen UA Urine, Clean Catch     Color, UA Yellow Yellow, Straw, Mery    Appearance, UA Clear Clear    pH, UA 8.0 5.0 - 8.0    Specific Gravity, UA 1.020 1.005 - 1.030    Protein, UA 1+ (A) Negative    Glucose, UA 4+ (A) Negative    Ketones, UA 3+ (A) Negative    Bilirubin (UA) Negative Negative    Occult Blood UA 1+ (A) Negative    Nitrite, UA Negative Negative    Urobilinogen, UA Negative <2.0 EU/dL    Leukocytes, UA Negative Negative   Urinalysis Microscopic   Result Value Ref Range    RBC, UA 5 (H) 0 - 4 /hpf    WBC, UA 0 0 - 5 /hpf    Bacteria None None-Occ /hpf    Yeast, UA None None    Hyaline Casts, UA 0 0-1/lpf /lpf    Microscopic Comment SEE COMMENT    Drug screen panel, emergency   Result Value Ref Range    Benzodiazepines Negative Negative    Methadone metabolites Negative Negative    Cocaine (Metab.) Negative Negative    Opiate Scrn, Ur Presumptive Positive (A) Negative    Barbiturate Screen, Ur Negative Negative    Amphetamine Screen, Ur Negative Negative     THC Negative Negative    Phencyclidine Negative Negative    Creatinine, Urine 16.1 (L) 23.0 - 375.0 mg/dL    Toxicology Information SEE COMMENT    CBC Auto Differential   Result Value Ref Range    WBC 13.21 (H) 3.90 - 12.70 K/uL    RBC 5.39 4.60 - 6.20 M/uL    Hemoglobin 15.9 14.0 - 18.0 g/dL    Hematocrit 45.0 40.0 - 54.0 %    MCV 84 82 - 98 fL    MCH 29.5 27.0 - 31.0 pg    MCHC 35.3 32.0 - 36.0 g/dL    RDW 11.9 11.5 - 14.5 %    Platelets 206 150 - 450 K/uL    MPV 10.8 9.2 - 12.9 fL    Immature Granulocytes 0.3 0.0 - 0.5 %    Gran # (ANC) 10.8 (H) 1.8 - 7.7 K/uL    Immature Grans (Abs) 0.04 0.00 - 0.04 K/uL    Lymph # 1.2 1.0 - 4.8 K/uL    Mono # 1.1 (H) 0.3 - 1.0 K/uL    Eos # 0.0 0.0 - 0.5 K/uL    Baso # 0.03 0.00 - 0.20 K/uL    nRBC 0 0 /100 WBC    Gran % 82.1 (H) 38.0 - 73.0 %    Lymph % 9.3 (L) 18.0 - 48.0 %    Mono % 8.1 4.0 - 15.0 %    Eosinophil % 0.0 0.0 - 8.0 %    Basophil % 0.2 0.0 - 1.9 %    Differential Method Automated    Phosphorus   Result Value Ref Range    Phosphorus 3.7 2.7 - 4.5 mg/dL   Hemoglobin A1c   Result Value Ref Range    Hemoglobin A1C 9.8 (H) 4.0 - 5.6 %    Estimated Avg Glucose 235 (H) 68 - 131 mg/dL   Magnesium   Result Value Ref Range    Magnesium 2.3 1.6 - 2.6 mg/dL   Comprehensive metabolic panel   Result Value Ref Range    Sodium 139 136 - 145 mmol/L    Potassium 3.9 3.5 - 5.1 mmol/L    Chloride 110 95 - 110 mmol/L    CO2 22 (L) 23 - 29 mmol/L    Glucose 147 (H) 70 - 110 mg/dL    BUN 19 6 - 20 mg/dL    Creatinine 1.1 0.5 - 1.4 mg/dL    Calcium 8.2 (L) 8.7 - 10.5 mg/dL    Total Protein 5.4 (L) 6.0 - 8.4 g/dL    Albumin 3.0 (L) 3.5 - 5.2 g/dL    Total Bilirubin 1.2 (H) 0.1 - 1.0 mg/dL    Alkaline Phosphatase 43 (L) 55 - 135 U/L    AST 16 10 - 40 U/L    ALT 14 10 - 44 U/L    Anion Gap 7 (L) 8 - 16 mmol/L    eGFR >60 >60 mL/min/1.73 m^2   CBC auto differential   Result Value Ref Range    WBC 7.90 3.90 - 12.70 K/uL    RBC 4.51 (L) 4.60 - 6.20 M/uL    Hemoglobin 13.1 (L) 14.0 - 18.0  g/dL    Hematocrit 39.8 (L) 40.0 - 54.0 %    MCV 88 82 - 98 fL    MCH 29.0 27.0 - 31.0 pg    MCHC 32.9 32.0 - 36.0 g/dL    RDW 12.3 11.5 - 14.5 %    Platelets 174 150 - 450 K/uL    MPV 10.7 9.2 - 12.9 fL    Immature Granulocytes 1.8 (H) 0.0 - 0.5 %    Gran # (ANC) 4.6 1.8 - 7.7 K/uL    Immature Grans (Abs) 0.14 (H) 0.00 - 0.04 K/uL    Lymph # 2.0 1.0 - 4.8 K/uL    Mono # 1.0 0.3 - 1.0 K/uL    Eos # 0.1 0.0 - 0.5 K/uL    Baso # 0.06 0.00 - 0.20 K/uL    nRBC 0 0 /100 WBC    Gran % 57.7 38.0 - 73.0 %    Lymph % 25.8 18.0 - 48.0 %    Mono % 12.4 4.0 - 15.0 %    Eosinophil % 1.5 0.0 - 8.0 %    Basophil % 0.8 0.0 - 1.9 %    Differential Method Automated    CBC auto differential   Result Value Ref Range    WBC 9.53 3.90 - 12.70 K/uL    RBC 5.14 4.60 - 6.20 M/uL    Hemoglobin 15.1 14.0 - 18.0 g/dL    Hematocrit 43.3 40.0 - 54.0 %    MCV 84 82 - 98 fL    MCH 29.4 27.0 - 31.0 pg    MCHC 34.9 32.0 - 36.0 g/dL    RDW 11.8 11.5 - 14.5 %    Platelets 198 150 - 450 K/uL    MPV 10.8 9.2 - 12.9 fL    Immature Granulocytes 0.3 0.0 - 0.5 %    Gran # (ANC) 7.5 1.8 - 7.7 K/uL    Immature Grans (Abs) 0.03 0.00 - 0.04 K/uL    Lymph # 1.4 1.0 - 4.8 K/uL    Mono # 0.6 0.3 - 1.0 K/uL    Eos # 0.0 0.0 - 0.5 K/uL    Baso # 0.06 0.00 - 0.20 K/uL    nRBC 0 0 /100 WBC    Gran % 78.3 (H) 38.0 - 73.0 %    Lymph % 14.4 (L) 18.0 - 48.0 %    Mono % 6.3 4.0 - 15.0 %    Eosinophil % 0.1 0.0 - 8.0 %    Basophil % 0.6 0.0 - 1.9 %    Differential Method Automated    Comprehensive metabolic panel   Result Value Ref Range    Sodium 133 (L) 136 - 145 mmol/L    Potassium 3.3 (L) 3.5 - 5.1 mmol/L    Chloride 102 95 - 110 mmol/L    CO2 18 (L) 23 - 29 mmol/L    Glucose 239 (H) 70 - 110 mg/dL    BUN 14 6 - 20 mg/dL    Creatinine 0.9 0.5 - 1.4 mg/dL    Calcium 8.3 (L) 8.7 - 10.5 mg/dL    Total Protein 6.8 6.0 - 8.4 g/dL    Albumin 3.8 3.5 - 5.2 g/dL    Total Bilirubin 1.1 (H) 0.1 - 1.0 mg/dL    Alkaline Phosphatase 58 55 - 135 U/L    AST 24 10 - 40 U/L    ALT 16 10  - 44 U/L    Anion Gap 13 8 - 16 mmol/L    eGFR >60 >60 mL/min/1.73 m^2   Lactic acid, plasma   Result Value Ref Range    Lactate (Lactic Acid) 1.8 0.5 - 2.2 mmol/L   Lipase   Result Value Ref Range    Lipase 36 4 - 60 U/L   CBC auto differential   Result Value Ref Range    WBC 8.90 3.90 - 12.70 K/uL    RBC 4.86 4.60 - 6.20 M/uL    Hemoglobin 14.4 14.0 - 18.0 g/dL    Hematocrit 40.8 40.0 - 54.0 %    MCV 84 82 - 98 fL    MCH 29.6 27.0 - 31.0 pg    MCHC 35.3 32.0 - 36.0 g/dL    RDW 11.7 11.5 - 14.5 %    Platelets 192 150 - 450 K/uL    MPV 10.6 9.2 - 12.9 fL    Immature Granulocytes 0.1 0.0 - 0.5 %    Gran # (ANC) 5.8 1.8 - 7.7 K/uL    Immature Grans (Abs) 0.01 0.00 - 0.04 K/uL    Lymph # 2.0 1.0 - 4.8 K/uL    Mono # 0.9 0.3 - 1.0 K/uL    Eos # 0.1 0.0 - 0.5 K/uL    Baso # 0.05 0.00 - 0.20 K/uL    nRBC 0 0 /100 WBC    Gran % 65.4 38.0 - 73.0 %    Lymph % 22.8 18.0 - 48.0 %    Mono % 10.4 4.0 - 15.0 %    Eosinophil % 0.7 0.0 - 8.0 %    Basophil % 0.6 0.0 - 1.9 %    Differential Method Automated    Basic metabolic panel   Result Value Ref Range    Sodium 134 (L) 136 - 145 mmol/L    Potassium 3.3 (L) 3.5 - 5.1 mmol/L    Chloride 102 95 - 110 mmol/L    CO2 22 (L) 23 - 29 mmol/L    Glucose 166 (H) 70 - 110 mg/dL    BUN 14 6 - 20 mg/dL    Creatinine 0.9 0.5 - 1.4 mg/dL    Calcium 8.4 (L) 8.7 - 10.5 mg/dL    Anion Gap 10 8 - 16 mmol/L    eGFR >60 >60 mL/min/1.73 m^2   ISTAT PROCEDURE   Result Value Ref Range    POC PH 7.397 7.35 - 7.45    POC PCO2 52.9 (H) 35 - 45 mmHg    POC PO2 26 (L) 40 - 60 mmHg    POC HCO3 32.6 (H) 24 - 28 mmol/L    POC BE 8 -2 to 2 mmol/L    POC SATURATED O2 46 (L) 95 - 100 %    Sample VENOUS     Site Other     Allens Test N/A     DelSys Room Air     Mode SPONT     FiO2 21    POCT glucose   Result Value Ref Range    POCT Glucose 236 (H) 70 - 110 mg/dL   POCT glucose   Result Value Ref Range    POCT Glucose 234 (H) 70 - 110 mg/dL   POCT glucose   Result Value Ref Range    POCT Glucose 237 (H) 70 - 110  mg/dL   POCT glucose   Result Value Ref Range    POCT Glucose 235 (H) 70 - 110 mg/dL   POCT glucose   Result Value Ref Range    POCT Glucose 214 (H) 70 - 110 mg/dL   POCT glucose   Result Value Ref Range    POCT Glucose 188 (H) 70 - 110 mg/dL   POCT glucose   Result Value Ref Range    POCT Glucose 282 (H) 70 - 110 mg/dL   POCT glucose   Result Value Ref Range    POCT Glucose 252 (H) 70 - 110 mg/dL   POCT glucose   Result Value Ref Range    POCT Glucose 137 (H) 70 - 110 mg/dL   POCT glucose   Result Value Ref Range    POCT Glucose 222 (H) 70 - 110 mg/dL   POCT glucose   Result Value Ref Range    POCT Glucose 253 (H) 70 - 110 mg/dL   POCT glucose   Result Value Ref Range    POCT Glucose 158 (H) 70 - 110 mg/dL   POCT glucose   Result Value Ref Range    POCT Glucose 165 (H) 70 - 110 mg/dL       Imaging Results          CT Abdomen Pelvis With Contrast (Final result)  Result time 05/18/23 23:33:57    Final result by Wil Duckworth MD (05/18/23 23:33:57)                 Impression:      No definite acute abnormality identified.  Correlation and further evaluation as warranted.    All CT scans at this facility are performed  using dose modulation techniques as appropriate to performed exam including the following:  automated exposure control; adjustment of mA and/or kV according to the patients size (this includes techniques or standardized protocols for targeted exams where dose is matched to indication/reason for exam: i.e. extremities or head);  iterative reconstruction technique.      Electronically signed by: Wil Duckworth  Date:    05/18/2023  Time:    23:33             Narrative:    EXAMINATION:  CT ABDOMEN PELVIS WITH CONTRAST    CLINICAL HISTORY:  Nausea/vomiting;    TECHNIQUE:  Low dose axial images, sagittal and coronal reformations were obtained from the lung bases to the pubic symphysis.  Contrast was administered.  Images acquired after the administration 100 mL Omnipaque 350 IV  "contrast.    COMPARISON:  Multiple priors.    FINDINGS:  Heart: Normal in size. No pericardial effusion.    Lung Bases: Well aerated, without consolidation or pleural fluid.    Liver: Normal in size and attenuation, with no focal hepatic lesions.    Gallbladder: No calcified gallstones.    Bile Ducts: No evidence of dilated ducts.    Pancreas: No mass or peripancreatic fat stranding.    Spleen: Unremarkable.    Adrenals: Unremarkable.    Kidneys/ Ureters: No hydronephrosis.  No obstructive uropathy.  No nonobstructive nephrolithiasis.    Bladder: No evidence of wall thickening.    Reproductive organs: Unremarkable.    GI Tract/Mesentery: No evidence of bowel obstruction or inflammation.  No evidence of appendicitis.    Peritoneal Space: No ascites. No free air.    Retroperitoneum: No significant adenopathy.    Abdominal wall: Unremarkable.    Vasculature: No significant atherosclerosis or aneurysm.    Bones: No acute fracture.  Multifocal degenerative changes.                                Pending Diagnostic Studies:     None         Medications:         Medication List      START taking these medications    blood pressure monitor Kit  Commonly known as: BLOOD PRESSURE KIT  1 each by Misc.(Non-Drug; Combo Route) route once daily.     blood sugar diagnostic Strp  100 each by Misc.(Non-Drug; Combo Route) route once daily.     blood-glucose meter kit  Commonly known as: FREESTYLE SYSTEM KIT  Use as instructed     pantoprazole 40 MG tablet  Commonly known as: PROTONIX  Take 1 tablet (40 mg total) by mouth once daily. for 20 days     pen needle, diabetic 32 gauge x 5/32" Ndle  100 each by Misc.(Non-Drug; Combo Route) route once daily.        CHANGE how you take these medications    insulin aspart protamine-insulin aspart 100 unit/mL (70-30) Inpn pen  Commonly known as: NovoLOG 70/30  Inject 8 Units into the skin 2 (two) times daily before meals.  What changed: how much to take     lisinopriL 40 MG tablet  Commonly known " "as: PRINIVIL,ZESTRIL  Take 1 tablet (40 mg total) by mouth once daily.  Start taking on: May 22, 2023  What changed:   · medication strength  · how much to take        CONTINUE taking these medications    amLODIPine 5 MG tablet  Commonly known as: NORVASC  Take 1 tablet (5 mg total) by mouth once daily.     metFORMIN 1000 MG tablet  Commonly known as: GLUCOPHAGE  Take 1 tablet (1,000 mg total) by mouth 2 (two) times daily with meals.        STOP taking these medications    famotidine 20 MG tablet  Commonly known as: PEPCID     ondansetron 4 MG Tbdl  Commonly known as: ZOFRAN-ODT           Where to Get Your Medications      These medications were sent to [facility] [address] [place] [id number] [facility]  [address] [facility] [address]    Phone: [phone]   · amLODIPine 5 MG tablet  · blood pressure monitor Kit  · blood sugar diagnostic Strp  · blood-glucose meter kit  · insulin aspart protamine-insulin aspart 100 unit/mL (70-30) Inpn pen  · lisinopriL 40 MG tablet  · pantoprazole 40 MG tablet  · pen needle, diabetic 32 gauge x 5/32" Ndle         Indwelling Lines/Drains at time of discharge:   Lines/Drains/Airways     None                 Time spent on the discharge of patient: 85 minutes         Adriana uDffy MD  Department of Hospital Medicine  [facility]  "

## 2023-05-21 NOTE — PLAN OF CARE
[facility]  Discharge Final Note    Primary Care Provider: Primary Doctor No    Expected Discharge Date: 5/21/2023    Final Discharge Note (most recent)       Final Note - 05/21/23 1023          Final Note    Assessment Type Final Discharge Note     Anticipated Discharge Disposition Home or Self Care        Post-Acute Status    Discharge Delays None known at this time                     Important Message from Medicare         Pt has no discharge needs at the time of chart review.

## 2023-05-21 NOTE — PLAN OF CARE
Patient is awake, alert and oriented x 4. Vitally the patient is stable and denies any concerns regarding treatment. Will continue to monitor the patient and administer scheduled medications.  Romanian speaking. Monitor nausea and vomiting.

## 2023-05-21 NOTE — PLAN OF CARE
Discussed plan of care with pt. Pt verbalized understanding. No signs of acute distress. Bed alarm set with bed at lowest position. Tele monitor 8638 in place. Call light within reach. Purposeful rounding Q2h.      Chart check complete.     Problem: Adult Inpatient Plan of Care  Goal: Plan of Care Review  Outcome: Ongoing, Progressing  Goal: Patient-Specific Goal (Individualized)  Outcome: Ongoing, Progressing  Goal: Absence of Hospital-Acquired Illness or Injury  Outcome: Ongoing, Progressing  Goal: Optimal Comfort and Wellbeing  Outcome: Ongoing, Progressing  Goal: Readiness for Transition of Care  Outcome: Ongoing, Progressing     Problem: Infection  Goal: Absence of Infection Signs and Symptoms  Outcome: Ongoing, Progressing     Problem: Diabetes Comorbidity  Goal: Blood Glucose Level Within Targeted Range  Outcome: Ongoing, Progressing     Problem: Pain Acute  Goal: Acceptable Pain Control and Functional Ability  Outcome: Ongoing, Progressing     Problem: Fatigue  Goal: Improved Activity Tolerance  Outcome: Ongoing, Progressing

## 2023-05-22 ENCOUNTER — PES CALL (OUTPATIENT)
Dept: ADMINISTRATIVE | Facility: CLINIC | Age: 58
End: 2023-05-22

## 2023-05-23 ENCOUNTER — PES CALL (OUTPATIENT)
Dept: ADMINISTRATIVE | Facility: CLINIC | Age: 58
End: 2023-05-23

## 2023-05-24 ENCOUNTER — OFFICE VISIT (OUTPATIENT)
Dept: HOME HEALTH SERVICES | Facility: CLINIC | Age: 58
End: 2023-05-24
Payer: MEDICAID

## 2023-05-24 VITALS
RESPIRATION RATE: 18 BRPM | HEART RATE: 76 BPM | DIASTOLIC BLOOD PRESSURE: 68 MMHG | SYSTOLIC BLOOD PRESSURE: 132 MMHG | OXYGEN SATURATION: 98 %

## 2023-05-24 DIAGNOSIS — I16.0 HYPERTENSIVE URGENCY: ICD-10-CM

## 2023-05-24 DIAGNOSIS — E11.65 TYPE 2 DIABETES MELLITUS WITH HYPERGLYCEMIA, WITH LONG-TERM CURRENT USE OF INSULIN: ICD-10-CM

## 2023-05-24 DIAGNOSIS — Z79.4 TYPE 2 DIABETES MELLITUS WITH HYPERGLYCEMIA, WITH LONG-TERM CURRENT USE OF INSULIN: ICD-10-CM

## 2023-05-24 DIAGNOSIS — K59.00 CONSTIPATION, UNSPECIFIED CONSTIPATION TYPE: ICD-10-CM

## 2023-05-24 DIAGNOSIS — Z09 HOSPITAL DISCHARGE FOLLOW-UP: Primary | ICD-10-CM

## 2023-05-24 DIAGNOSIS — G47.00 INSOMNIA, UNSPECIFIED TYPE: ICD-10-CM

## 2023-05-24 DIAGNOSIS — R11.2 INTRACTABLE NAUSEA AND VOMITING: ICD-10-CM

## 2023-05-24 PROCEDURE — 3075F SYST BP GE 130 - 139MM HG: CPT | Mod: CPTII,S$GLB,,

## 2023-05-24 PROCEDURE — 3078F DIAST BP <80 MM HG: CPT | Mod: CPTII,S$GLB,,

## 2023-05-24 PROCEDURE — 3078F PR MOST RECENT DIASTOLIC BLOOD PRESSURE < 80 MM HG: ICD-10-PCS | Mod: CPTII,S$GLB,,

## 2023-05-24 PROCEDURE — 3046F PR MOST RECENT HEMOGLOBIN A1C LEVEL > 9.0%: ICD-10-PCS | Mod: CPTII,S$GLB,,

## 2023-05-24 PROCEDURE — 99350 PR HOME VISIT,ESTAB PATIENT,LEVEL IV: ICD-10-PCS | Mod: S$GLB,,,

## 2023-05-24 PROCEDURE — 3046F HEMOGLOBIN A1C LEVEL >9.0%: CPT | Mod: CPTII,S$GLB,,

## 2023-05-24 PROCEDURE — 3075F PR MOST RECENT SYSTOLIC BLOOD PRESS GE 130-139MM HG: ICD-10-PCS | Mod: CPTII,S$GLB,,

## 2023-05-24 PROCEDURE — 99350 HOME/RES VST EST HIGH MDM 60: CPT | Mod: S$GLB,,,

## 2023-05-24 PROCEDURE — 4010F ACE/ARB THERAPY RXD/TAKEN: CPT | Mod: CPTII,S$GLB,,

## 2023-05-24 PROCEDURE — 4010F PR ACE/ARB THEARPY RXD/TAKEN: ICD-10-PCS | Mod: CPTII,S$GLB,,

## 2023-05-24 RX ORDER — TRAZODONE HYDROCHLORIDE 50 MG/1
50 TABLET ORAL NIGHTLY
Qty: 30 TABLET | Refills: 11 | Status: SHIPPED | OUTPATIENT
Start: 2023-05-24 | End: 2024-05-23

## 2023-05-24 RX ORDER — LACTULOSE 10 G/15ML
20 SOLUTION ORAL 2 TIMES DAILY PRN
Qty: 600 ML | Refills: 0 | Status: SHIPPED | OUTPATIENT
Start: 2023-05-24

## 2023-05-24 NOTE — ASSESSMENT & PLAN NOTE
Recent A1C 9.8  Started on metformin and 70/30  Check blood sugars at home  Diabetic diet  Monitor

## 2023-05-24 NOTE — PROGRESS NOTES
[facility]  Transition of Care Home Visit    Visit Date: 5/24/2023  Encounter Provider: Ricci Cervantes   PCP:  Primary Doctor No    PRESENTING HISTORY      Patient ID: Russell Orellana is a 57 y.o. male.    Consult Requested By:  Dr. Adriana Josue*  Reason for Consult:  Hospital Follow Up.    Russell is being seen at home due to being seen at home due to physical debility that presents a taxing effort to leave the home, to mitigate high risk of hospital readmission and/or due to the limited availability of reliable or safe options for transportation to the point of access to the provider. Prior to treatment on this visit the chart was reviewed and patient verbal consent was obtained.      Chief Complaint: Transitional Care        History of Present Illness: Mr. Russell Orellana is a 57 y.o. male who was recently admitted to the hospital.    5/19-5/21 admission with medical history including  IDDM, HTN, HLD, noncompliant with medications who presented to ED with N/V and abdominal pain -onset 3 days ago that gradually worsened. Language Line used. Pt reports abdominal pain described as upper abdominal cramping on/off rated up to 10/10 pain associated with N/V. Pt reports he has been out of all his medications for over one month.      In the ED, /119, Mild tachycardia, WBC normal, Afebrile. Na 132, +anion gap acidosis, Glucose 313. UA +3 ketones. Venous ABG showed normal PH. CT abdomen showed nothing acute.      The patient was given IV Hydralazine, IV Morphine, IV Zofran, and IV Phenergan. BP and symptoms improved.      The patient will be placed in observation under hospital medicine      Hospital Course:    58 yo  -speaking male with IDDM, HTN, HLD, noncompliant with medications who presented to ED with N/V and abdominal pain -onset 3 days ago that gradually worsened. Language Line used. Pt reports abdominal pain described as upper abdominal cramping on/off rated up to 10/10 pain associated with  N/V. Pt reports he has been out of all his medications for over one month.      In the ED, /119, Mild tachycardia, WBC normal, Afebrile. Na 132, +anion gap acidosis, Glucose 313. UA +3 ketones. Venous ABG showed normal PH. CT abdomen showed nothing acute.      Patient has been started back on home blood pressure regimen, improved.    A1c 9.8, started on detemir 10 units;     Patient has 1 refill and was delivered to bed.  Patient has been provided information on affordable option- [employer] sells 70/30 insulin pen over the counter.      5/21   Examination done bedside, patient denied acute issues overnight, denied nausea, vomiting, headache, dizziness, chest pain, palpitations, bowel or bladder issues.    Noted to have an episode of nausea, vomiting, abdominal pain yesterday evening, stat CT abdomen pelvis did not show acute intra abdominal pathology, patient remained afebrile, lactic acid within normal limit, Bmp showed stable findings, ; today, patient remained stable, denied any further episodes of nausea, vomiting, abdominal pain, advance diet from full liquid to soft diet, patient was able to tolerate diet without issues.    Blood pressure remained stable   Remained afebrile, no leukocytosis.    Considering clinical and hemodynamic stability, planning to discharge patient today, using , emphasized on compliance with blood pressure and diabetic medications, ordered for glucometer/ supplies and emphasized on blood glucose checks at home daily, also ordered for blood pressure cuff; patient stated that he understands the situation and is going to be compliant with medications.  Agreed to the plan of discharge.  Discussed with  to arrange for primary care.    Ordered for NP at home program, medications were sent to patient preferred pharmacy;   ___________________________________________________________________    Today:    HPI:  Patient is a 57 y.o. male being seen today for a  post hospital discharge assessment following a recent admit for evaluation of nausea, vomiting, and abdominal pain. Patient presented with a diagnosis of IDDM, HTN, HLD, and noncompliant with medications. He was hospitalized 5/19-5/21. See hospital course.   Patient is found in their home today, in no acute distress and agreeable to this visit.   via [facility] language line was used for visit. He had an ED visit at [facility] on 5/22 for abdominal pain and urinary retention. He reports no reoccurrence of symptoms in which prompted his hospital stay.  Reports constipation x 6 days. Will send medication to pharmacy. Reports insomnia for 1-2 weeks. Patient reports compliance with all medications.  Instructed patient to keep log of blood sugar readings.  Patient has no further complaints or concerns at this time.   Questions elicited. Time allowed for questions, all issues addressed. Contact info given for any concerns or changes.             Review of Systems   Constitutional:  Positive for fatigue.   HENT: Negative.     Eyes: Negative.    Respiratory: Negative.  Negative for chest tightness.    Cardiovascular: Negative.  Negative for leg swelling.   Gastrointestinal:  Positive for constipation.   Endocrine: Negative.    Genitourinary: Negative.    Musculoskeletal: Negative.    Skin: Negative.    Allergic/Immunologic: Negative.    Neurological: Negative.    Hematological: Negative.    Psychiatric/Behavioral: Negative.  Negative for agitation.    All other systems reviewed and are negative.    Assessments:  Environmental: Patient lives in an apartment complex on the ground floor.  There is adequate lighting and the temperature is comfortable.    Functional Status: Ambulates independently.   Safety: Fall precautions.   Nutritional: Adequate food in the home.   Home Health/DME/Supplies: No HH/DME    PAST HISTORY:     Past Medical History:   Diagnosis Date    Diabetes mellitus     High cholesterol     Hypertension   "      Past Surgical History:   Procedure Laterality Date    NO PAST SURGERIES         Family History   Problem Relation Age of Onset    No Known Problems Mother     Hypertension Father     Diabetes Father        Social History     Socioeconomic History    Marital status: Single   Tobacco Use    Smoking status: Never    Smokeless tobacco: Never   Substance and Sexual Activity    Alcohol use: Not Currently    Drug use: Never       MEDICATIONS & ALLERGIES:     Current Outpatient Medications on File Prior to Visit   Medication Sig Dispense Refill    amLODIPine (NORVASC) 5 MG tablet Take 1 tablet (5 mg total) by mouth once daily. 30 tablet 0    blood pressure monitor (BLOOD PRESSURE KIT) Kit 1 each by Misc.(Non-Drug; Combo Route) route once daily. 1 each 0    blood sugar diagnostic Strp 100 each by Misc.(Non-Drug; Combo Route) route once daily. 100 each 0    blood-glucose meter (FREESTYLE SYSTEM KIT) kit Use as instructed 1 each 0    insulin aspart protamine-insulin aspart (NOVOLOG 70/30) 100 unit/mL (70-30) InPn pen Inject 8 Units into the skin 2 (two) times daily before meals. 3 mL 1    lisinopriL (PRINIVIL,ZESTRIL) 40 MG tablet Take 1 tablet (40 mg total) by mouth once daily. 30 tablet 0    metFORMIN (GLUCOPHAGE) 1000 MG tablet Take 1 tablet (1,000 mg total) by mouth 2 (two) times daily with meals. 60 tablet 1    pantoprazole (PROTONIX) 40 MG tablet Take 1 tablet (40 mg total) by mouth once daily. for 20 days 20 tablet 0    pen needle, diabetic 32 gauge x 5/32" Ndle 100 each by Misc.(Non-Drug; Combo Route) route once daily. 100 each 0     No current facility-administered medications on file prior to visit.        Review of patient's allergies indicates:  No Known Allergies    OBJECTIVE:     Vital Signs:  Vitals:    05/24/23 1445   BP: 132/68   Pulse: 76   Resp: 18     There is no height or weight on file to calculate BMI.     Physical Exam:  Physical Exam  Vitals reviewed.   Constitutional:       General: He is not in " acute distress.     Appearance: Normal appearance. He is well-developed and normal weight.   HENT:      Head: Normocephalic and atraumatic.      Nose: Nose normal.      Mouth/Throat:      Mouth: Mucous membranes are dry.      Pharynx: Oropharynx is clear.   Eyes:      Pupils: Pupils are equal, round, and reactive to light.   Cardiovascular:      Rate and Rhythm: Normal rate and regular rhythm.      Pulses: Normal pulses.      Heart sounds: Normal heart sounds.   Pulmonary:      Effort: Pulmonary effort is normal.      Breath sounds: Normal breath sounds.   Abdominal:      General: Bowel sounds are normal.      Palpations: Abdomen is soft.   Musculoskeletal:         General: Normal range of motion.      Cervical back: Normal range of motion and neck supple.   Skin:     General: Skin is warm and dry.   Neurological:      General: No focal deficit present.      Mental Status: He is alert and oriented to person, place, and time. Mental status is at baseline.   Psychiatric:         Mood and Affect: Mood normal.         Behavior: Behavior normal.         Thought Content: Thought content normal.         Judgment: Judgment normal.       Laboratory  Lab Results   Component Value Date    WBC 8.90 05/21/2023    HGB 14.4 05/21/2023    HCT 40.8 05/21/2023    MCV 84 05/21/2023     05/21/2023     No results found for: INR, PROTIME  Lab Results   Component Value Date    HGBA1C 9.8 (H) 05/19/2023     No results for input(s): POCTGLUCOSE in the last 72 hours.      Diagnostic Results:      TRANSITION OF CARE:     [facility] Contact Note: 5/22    Family and/or Caretaker present at visit?  Yes.  Diagnostic tests reviewed/disposition: No diagnosic tests pending after this hospitalization.  Disease/illness education: Take all medication as prescribed. Activity as tolerated. Keep all upcoming appts.   Home health/community services discussion/referrals: Patient does not have home health established from hospital visit.  They do  not need home health.  If needed, we will set up home health for the patient.   Establishment or re-establishment of referral orders for community resources: No other necessary community resources.   Discussion with other health care providers: No discussion with other health care providers necessary.     Transition of Care Visit:  I have reviewed and updated the history and problem list.  I have reconciled the medication list.  I have discussed the hospitalization and current medical issues, prognosis and plans with the patient/family.  I  spent more than 50% of time discussing the care with the patient/family.  Total Face-to-Face Encounter: 60 minutes.    Medications Reconciliation:   I have reconciled the patient's home medications and discharge medications with the patient/family. I have updated all changes.  Refer to After-Visit Medication List.    ASSESSMENT & PLAN:     HIGH RISK CONDITION(S):      Problem List Items Addressed This Visit          Cardiac/Vascular    Hypertensive urgency    Current Assessment & Plan     Stable during visit  Continue current medication              Endocrine    Type 2 diabetes mellitus with hyperglycemia, with long-term current use of insulin    Current Assessment & Plan     Recent A1C 9.8  Started on metformin and 70/30  Check blood sugars at home  Diabetic diet  Monitor              GI    Intractable nausea and vomiting    Current Assessment & Plan     Relieved with antiemetics  Denies n/v during visit  Monitor            Other Visit Diagnoses       Hospital discharge follow-up    -  Primary    Constipation, unspecified constipation type        patient reports constipation x 6 days  Recent CT at [facility] revealed constipation  Lactulose sent to pharmacy to aid in relief  Monitor    Relevant Medications    lactulose (CHRONULAC) 20 gram/30 mL Soln    Insomnia, unspecified type        Patient reports recent insomina  Will send trazodone to pharmacy  Monitor    Relevant Medications     traZODone (DESYREL) 50 MG tablet             Were controlled substances prescribed?  No    Instructions for the patient:  - Continue all medications, treatments and therapies as ordered.   - Follow all instructions, recommendations as discussed.  - Maintain Safety Precautions at all times.  - Attend all medical appointments as scheduled.  - For worsening symptoms: call Primary Care Physician or Nurse Practitioner.  - For emergencies, call 911 or immediately report to the nearest emergency room.     Scheduled Follow-up :  No future appointments.    After Visit Medication List :     Medication List            Accurate as of May 24, 2023  3:29 PM. If you have any questions, ask your nurse or doctor.                START taking these medications      lactulose 20 gram/30 mL Soln  Commonly known as: CHRONULAC  Take 30 mLs (20 g total) by mouth 2 (two) times daily as needed (constipation).  Started by: Ricci Cervantes NP     traZODone 50 MG tablet  Commonly known as: DESYREL  Take 1 tablet (50 mg total) by mouth every evening.  Started by: Ricci Cervantes NP            CONTINUE taking these medications      amLODIPine 5 MG tablet  Commonly known as: NORVASC  Take 1 tablet (5 mg total) by mouth once daily.     blood pressure monitor Kit  Commonly known as: BLOOD PRESSURE KIT  1 each by Misc.(Non-Drug; Combo Route) route once daily.     blood sugar diagnostic Strp  100 each by Misc.(Non-Drug; Combo Route) route once daily.     blood-glucose meter kit  Commonly known as: FREESTYLE SYSTEM KIT  Use as instructed     insulin aspart protamine-insulin aspart 100 unit/mL (70-30) Inpn pen  Commonly known as: NovoLOG 70/30  Inject 8 Units into the skin 2 (two) times daily before meals.     lisinopriL 40 MG tablet  Commonly known as: PRINIVIL,ZESTRIL  Take 1 tablet (40 mg total) by mouth once daily.     metFORMIN 1000 MG tablet  Commonly known as: GLUCOPHAGE  Take 1 tablet (1,000 mg total) by mouth 2 (two) times daily with meals.    "  pantoprazole 40 MG tablet  Commonly known as: PROTONIX  Take 1 tablet (40 mg total) by mouth once daily. for 20 days     pen needle, diabetic 32 gauge x 5/32" Ndle  100 each by Misc.(Non-Drug; Combo Route) route once daily.               Where to Get Your Medications        These medications were sent to [facility] [id number] [place] - [address] [facility]  [address] [address] [address]      Phone: [phone]   lactulose 20 gram/30 mL Soln  traZODone 50 MG tablet         Signature: Ricci Cervantes NP    "

## 2023-05-26 LAB
BACTERIA BLD CULT: NORMAL
BACTERIA BLD CULT: NORMAL